# Patient Record
Sex: FEMALE | Race: BLACK OR AFRICAN AMERICAN | Employment: UNEMPLOYED | URBAN - METROPOLITAN AREA
[De-identification: names, ages, dates, MRNs, and addresses within clinical notes are randomized per-mention and may not be internally consistent; named-entity substitution may affect disease eponyms.]

---

## 2017-12-28 ENCOUNTER — TELEPHONE (OUTPATIENT)
Dept: BEHAVIORAL HEALTH | Facility: CLINIC | Age: 27
End: 2017-12-28

## 2017-12-28 NOTE — TELEPHONE ENCOUNTER
S:  12/28/17 Call from Candi Fernandes (/[phone])   referring client for diagnostic assessment for the MICD Program  Reported the client does not have any OP providers at this time.   B:  Reported client has a lot of grief and sadness. She has had   lot of loses; she has difficulty processing the lose in her family   and death of her partner. Also reported the client has a hx of  Marijuana, denies legal issues  A:  DA for MICD  R:   will call back with the insurance information,  to be referred to MICD. DEBRA

## 2018-01-26 NOTE — TELEPHONE ENCOUNTER
1-26-18 Child Protection Worker Candi Fernandes [phone] called to ref. Client to Adult Dual DA.  She will fax Rule 25 to intake.    MH: PTSD,   Psychiatrist: none  Therapist: a few months ago Soledad Tejeda.  Meds: none  PCP: Unknown.    Requested roselyn.

## 2018-01-29 NOTE — TELEPHONE ENCOUNTER
Received an outdate rule 25 originally dated 11/28/17 then stated DOS authorized 12/8/17.  Hand written on Rule 25 on page 13 that Tona Nettles/Chris Welsh approved tx at [facility].  Spoke to Tona Nettles, [phone].  She stated pt was approved for [facility] not .  Was later told patient had started using opiates and wanted [facility].  Rule 25 is outdated and referring party needs to be referred back to [facility] and updated info provided.  Pt now has [facility] per Tona Nettles.  Tried to leave a message for Child Protection worker, Candi Fernandes, [phone], but voice mailbox was full.  Filed.  No referral made until hear from Chris Welsh rule 25 and r/o opiate use.

## 2018-01-29 NOTE — TELEPHONE ENCOUNTER
1-29-18 Per Josh at [facility] no auth needed for  DA.  Referral made to Adult Dual DA. fb

## 2018-03-13 ENCOUNTER — TELEPHONE (OUTPATIENT)
Dept: BEHAVIORAL HEALTH | Facility: CLINIC | Age: 28
End: 2018-03-13

## 2018-03-13 NOTE — TELEPHONE ENCOUNTER
Telephone  Open      12/28/2017  Behavioral Health Intake    Generic, Behavioral Intake, MD     Outpatient   Reason for call    Conversation: MH Outpatient   (Newest Message First)   February 22, 2018   Joan Espinoza        11:38 AM   Note      Date: 2/22/18     Pt. Called to schedule DA for 3/12/18 @ 0900 w/Oskar P. For MICD.                                Casie Campos   to Joan Espinoza           11:37 AM   Pt. called to reschedule DA for 3/12/18 @ 0900 w/Oskar for MICD.    February 8, 2018   Me        9:04 AM   Note      MICD DA scheduled 2/20/18 @ 1200 w/ Oskar P.         Casie Campos   to Me           9:04 AM   Pt called and rescheduled DA for 2/20/18 @ 1200 w/ Oskar P.  Confirmed location.      February 7, 2018   Me   to Casie Campos           10:22 AM   2/6/18 @ 1505: Pt called requesting to reschedule NO CALL / NO SHOW missed DA.  Called pt and left vmail requesting call back to reschedule DA.    January 30, 2018   Joan Espinoza        12:34 PM   Note      Date: 01/30/18     Scheduled DA for MICD 2/5 @ 0900 w/Oskar.          Casie Campos   to Joan Espinoza           12:34 PM   Pt. called to schedule DA for 2/5 @ 0900 for MICD w/Oskar    Me   to Casie Campos           10:18 AM   Called to schedule DA for MICD. Left Vm.    January 29, 2018   Nohemy Atkinson        1:12 PM   Note      1-29-18 Per Josh at [facility] no auth needed for MH DA.  Referral made to Adult Dual DA. maxime Campos        10:35 AM   Note      Received an outdate rule 25 originally dated 11/28/17 then stated DOS authorized 12/8/17.  Hand written on Rule 25 on page 13 that Tona Nettles/Chris Welsh approved tx at [facility].  Spoke to Tona Nettles, [phone].  She stated pt was approved for [facility] not [facility].  Was later told patient had started using opiates and wanted [facility].  Rule 25 is outdated and referring party needs to be referred back to [facility] and updated info provided.  Pt now has [facility] per  Tona Nettles.  Tried to leave a message for Child Protection worker, Candi Fernandes, [phone], but voice mailbox was full.  Filed.  No referral made until hear from Chris Welsh rule 25 and r/o opiate use.          January 26, 2018   Nohemy Atkinson        3:44 PM   Note      1-26-18 Child Protection Worker Candi Fernandes [phone] called to ref. Client to Adult Dual DA.  She will fax Rule 25 to intake.     MH: PTSD,   Psychiatrist: none  Therapist: a few months ago Soledad Tejeda.  Meds: none  PCP: Unknown.     Requested roselyn.             December 28, 2017   Silver Russell        12:21 PM   Note      S:  12/28/17 Call from Candi Fernandes (/[phone])   referring client for diagnostic assessment for the MICD Program  Reported the client does not have any OP providers at this time.   B:  Reported client has a lot of grief and sadness. She has had   lot of loses; she has difficulty processing the lose in her family   and death of her partner. Also reported the client has a hx of  Marijuana, denies legal issues  A:  DA for MICD  R:   will call back with the insurance information,  to be referred to MICD. DEBRA

## 2018-03-27 ENCOUNTER — BEH TREATMENT PLAN (OUTPATIENT)
Dept: BEHAVIORAL HEALTH | Facility: CLINIC | Age: 28
End: 2018-03-27
Attending: NURSE PRACTITIONER

## 2018-03-27 ENCOUNTER — HOSPITAL ENCOUNTER (OUTPATIENT)
Dept: BEHAVIORAL HEALTH | Facility: CLINIC | Age: 28
Discharge: HOME | End: 2018-03-27
Attending: PSYCHIATRY & NEUROLOGY | Admitting: PSYCHIATRY & NEUROLOGY
Payer: COMMERCIAL

## 2018-03-27 DIAGNOSIS — F43.10 PTSD (POST-TRAUMATIC STRESS DISORDER): Primary | ICD-10-CM

## 2018-03-27 PROCEDURE — 90791 PSYCH DIAGNOSTIC EVALUATION: CPT | Performed by: PSYCHOLOGIST

## 2018-03-27 ASSESSMENT — PAIN SCALES - GENERAL: PAINLEVEL: EXTREME PAIN (8)

## 2018-03-27 NOTE — PROGRESS NOTES
Initial Individual Treatment Plan     Patient: Casie Campos   MRN: [id number]  : 1990  Age: 28 year old  Sex: female    Diagnostic Assessment Date / Date of Initial Individual Treatment Plan: 3/27/18      Immediate Health Concerns:  No     Immediate Safety Concerns:  No. Per history, see safety plan.    Identify the issues to be addressed in treatment:  Symptom Management, Personal Safety, Community Resources/Discharge Planning, Abstinence/Relapse Prevention, Develop / Improve Independent Living Skills and Develop Socialization / Interpersonal Relationship Skills    Client Initial Individualized Goals for Treatment: work on depression and anxiety.       Initial Treatment suggestions for the client during the time between Diagnostic Assessment and completion of the Individualized Treatment Plan:  Follow Safety Plan  Abstain from Substance Use   Ask for more information, support and/or assistance as needed.  Follow up with providers/community supports as needed:   Report increases or changes in symptoms to staff.  Report any personal safety concerns to staff.   Take medications as prescribed.  Report medication changes and/or side effects to staff.  Attend and participate in groups as scheduled or notify staff if unable to do so.  Report any use of substances to staff as this may impact your symptoms and/or  personal safety.  Notify staff if you have any other issues that need to be addressed. This may include  any current abuse / neglect / exploitation or other vulnerability.  Follow recommendations of your treatment team and discuss concerns if not in  agreement.     Treatment Team Responsible: MI/CD Treatment (MICD)      Therapeutic Interventions/Treatment Strategies may include:  Support, Redirection, Feedback, Limit/Boundaries, Safety Assessments, Structured Activity, Problem Solving, Clarification, Education, Motivational Enhancement and Relapse Prevention as needed.    Anthony Erickson  Juvenal        Admission Date: 4/4/2018    The Initial Individual Treatment Plan was reviewed with Casie A Andres upon admission.      Casie reported her last use of substances as: two days ago    Identify any current concerns with potential to impact admission:     withdrawal/intoxication: No withdrawal.      medication/medical concerns: No med changes, no medical concerns.      immediate safety concerns: No concerns     Other: No other concerns    DIMENSION  ADMIT RATING   1 Acute Intoxication / Withdrawal Potential 0   2 Biomedical Conditions & Complications 1   3 Emotional / Behavioral / Cognitive Conditions & Complications 2   4 Treatment Acceptance / Resistance: 1   5 Continued Use / Relapse Prevention 3   6 Recovery Environment 1         Completed by: Anthony Sanford MA [facility]

## 2018-03-27 NOTE — PROGRESS NOTES
MY COPING PLAN FOR SAFETY          Things that are most important to me & reasons for living: Kids               My relapse Warning Signs: depression, sadness, isolation  I will make my environment safer by: Nothing identified.   When in crisis, I will use the following coping skills: (relaxation/self-soothing/distraction/activity):  Coloring, headphones, singing  I will use My Support System:   Personal Supports: I can ask for reminders, support, or for them to stay with me  Trusted Friend/s:     Family Member/s :                   Professional Supports: I can ask for med changes, emergency appointments, help  Psychiatrist:    Therapist:      Other:     Crisis Lines I can call to discuss options and to access support:  [facility]:    [place] ([facility]) [phone]   [place] (Mental Health Crisis Program) [phone]   [place]  [phone]   [place] (COPE) [phone]   [place] [phone]   [place] ([facility]  Health Crisis Line) [phone]   [place] ([place], [place], [place], [place], [facility]) [phone]   Other Crisis Lines:   C[facility]is [facility] (Counseling) [phone]   National Suicide Prevention [phone]   Suicide Prevention [phone]        yes   I will attend my Treatment Program and talk to my one of my therapists:       yes   I agree that I will report any current / recent thoughts, impulses or plans of suicide,           homicide, self injurious behavior or substance use .   yes   I agree that I will not act on the above symptoms, but will follow the above plan         instead.  I can also go to the Emergency Department at [facility]: [facility] [phone] or call: [phone]

## 2018-03-27 NOTE — PROGRESS NOTES
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    Referrals to services required by statute or rule:   Report to child/adult protection services was NA.     Program Recommendation: MI/CD Treatment (MICD) .      Assessment Completed by: Anthony Sanford MA [facility]    "

## 2018-03-27 NOTE — PROGRESS NOTES
Acknowledgement of Current Treatment Plan       I have reviewed my treatment plan with my therapist / counselor on 3/27/2018. I agree with the plan as it is written in the electronic health record.    Name Signature   Casie Campos    Name of Therapist / Counselor    Anthony Sanford MA [facility]

## 2018-03-28 ENCOUNTER — TELEPHONE (OUTPATIENT)
Dept: BEHAVIORAL HEALTH | Facility: CLINIC | Age: 28
End: 2018-03-28

## 2018-03-28 NOTE — TELEPHONE ENCOUNTER
----- Message from Anthony Sanford LP sent at 3/28/2018 10:41 AM CDT -----  Regarding: Start MICD  Robel     Pt has a planned start in the 9 AM track of MICD IOP on 4/4/2018.    Thank you,    Anthony Sanford MA, LP [facility]

## 2018-04-04 ENCOUNTER — HOSPITAL ENCOUNTER (OUTPATIENT)
Dept: BEHAVIORAL HEALTH | Facility: CLINIC | Age: 28
End: 2018-04-04
Attending: NURSE PRACTITIONER
Payer: MEDICAID

## 2018-04-04 VITALS — WEIGHT: 133 LBS | BODY MASS INDEX: 22.71 KG/M2 | HEIGHT: 64 IN

## 2018-04-04 LAB
AMPHETAMINES UR QL SCN: POSITIVE
BARBITURATES UR QL: NEGATIVE
BENZODIAZ UR QL: NEGATIVE
CANNABINOIDS UR QL SCN: POSITIVE
COCAINE UR QL: NEGATIVE
ETHANOL UR QL SCN: NEGATIVE
OPIATES UR QL SCN: POSITIVE

## 2018-04-04 PROCEDURE — 80307 DRUG TEST PRSMV CHEM ANLYZR: CPT | Performed by: NURSE PRACTITIONER

## 2018-04-04 PROCEDURE — 80320 DRUG SCREEN QUANTALCOHOLS: CPT | Performed by: NURSE PRACTITIONER

## 2018-04-04 PROCEDURE — 80349 CANNABINOIDS NATURAL: CPT | Performed by: NURSE PRACTITIONER

## 2018-04-04 PROCEDURE — H2012 BEHAV HLTH DAY TREAT, PER HR: HCPCS

## 2018-04-04 ASSESSMENT — ANXIETY QUESTIONNAIRES
6. BECOMING EASILY ANNOYED OR IRRITABLE: MORE THAN HALF THE DAYS
1. FEELING NERVOUS, ANXIOUS, OR ON EDGE: MORE THAN HALF THE DAYS
IF YOU CHECKED OFF ANY PROBLEMS ON THIS QUESTIONNAIRE, HOW DIFFICULT HAVE THESE PROBLEMS MADE IT FOR YOU TO DO YOUR WORK, TAKE CARE OF THINGS AT HOME, OR GET ALONG WITH OTHER PEOPLE: SOMEWHAT DIFFICULT
7. FEELING AFRAID AS IF SOMETHING AWFUL MIGHT HAPPEN: MORE THAN HALF THE DAYS
GAD7 TOTAL SCORE: 14
3. WORRYING TOO MUCH ABOUT DIFFERENT THINGS: MORE THAN HALF THE DAYS
2. NOT BEING ABLE TO STOP OR CONTROL WORRYING: MORE THAN HALF THE DAYS
5. BEING SO RESTLESS THAT IT IS HARD TO SIT STILL: MORE THAN HALF THE DAYS

## 2018-04-04 ASSESSMENT — PATIENT HEALTH QUESTIONNAIRE - PHQ9: 5. POOR APPETITE OR OVEREATING: MORE THAN HALF THE DAYS

## 2018-04-04 NOTE — TELEPHONE ENCOUNTER
----- Message from Chloe Bowen sent at 4/4/2018  2:15 PM CDT -----  Pt is no longer active with ma as of 3/31/2018 no insurance for up coming appointment

## 2018-04-04 NOTE — PROGRESS NOTES
RN Review of Medical History / Physical Health Screen  Outpatient Behavioral Programs      CLIENT'S NAME: Casie Campos  MRN:   [id number]  :   1990 AGE:28 year old SEX: female    DATE OF DIAGNOSTIC ASSESSMENT: 3/27/18  DATE OF ADMISSION: 18   PROGRAM: MI/CD Treatment (MICD)      Following admission, the RN reviewed the following:    - Medical History / Physical Health Screen completed during the DA noted above.  - Immediate Health Concerns as noted on the Initial Individual Treatment Plan.    Client height and weight recorded by RN in epic: yes    BMI Review:  Was the patient informed of BMI? yes      Findings Normal, No Intervention       RN Recommendations include: Continue to educate on nutrition and exercise. Educate on the importance to monitor regularly and if increase continues follow up with primary provider.        Suman Barragan  2018

## 2018-04-04 NOTE — PROGRESS NOTES
"MICD Progress Note / Treatment Plan Review       Patient: Casie Campos   MRN: [id number]  : 1990  Age: 28 year old  Sex: female    Week of: 18-18     Client Initial Individualized Goals for Treatment:  work on depression and anxiety.     See Initial Treatment suggestions for the client during the time between Diagnostic Assessment and completion of the Master Individualized Treatment Plan.    Treatment Goals:    Treatment plan will be formulated on 18.    Active Psychiatric Symptoms Impairing:   Thought, Mood, Behavior and Perception    Area of Treatment Focus:  Symptom Management, Personal Safety, Community Resources/Discharge Planning and Abstinence/Relapse Prevention    Treatment Strategies:   Support, Feedback, Limit/Boundaries, Safety Assessments, Structured Activity, Problem Solving, Clarification, Education, Motivational Enhancement Therapy and Cognitive Behavioral Therapy    Patient Response:  Accepted Feedback, Gave Feedback, Listened, Focused on Goals, Attentive, Accepted Support and Alert    Dimension Risk Description and Outcome:    Dimension One: Acute Intoxication/Withdrawal Potential   Daily Note:  2018:  Casie reports that her last use occurred 2 days ago.  (CUONG)    Dimension Two: Biomedical Conditions and Complications  Daily Note:  2018:  Denies any physical health issues reported at this time.  (CUONG)    Dimension Three: Emotional/Behavioral / Cognitive Conditions & Complications  Daily Note:  2018: Casie introduced herself to the group and states that she would Iike to \"simply observe\" group today.  She did not participate in psychotherapy.  (CUONG)    Dimension Four: Treatment Acceptance / Resistance  Daily Note:  2018:  Interpersonal Relationships (10-10:50am):  Casie actively listened during the group on Trust and participated minimally however she expressed that she would be mostly \"observing\" groups today.  (CUONG) 2018:  Casie did not " attend treatment on this date as a result of oversleeping.  (CUONG)    Dimension Five: Continued Use/Relapse Prevention  Daily Note: 4/4/2018:  At high risk for continued use of chemicals.  (CUONG) 4/04/[id number] - Relapse Prevention Group:  Casie introduced self and shared about how she feels discontinuing use will be a challenge. She reports starting her use of marijauana at a young age and not really knowing how else life is supposed to be. While interacting in peer discussion, Casie related through use of stories from her own family - one who is addicted to opiates, the other who has severe pancreatitis.  Casie required some redirection to focus back on the peer or on her own experience. Casie listened during a peer behavior chain to process relapse and offered feedback and support. Group briefly discussed the long term damage to the body and brain from heavy substance use and the hope that although some damage may be irreversible, that there are many ways the body and brain can regenerate healthy cells and build new pathways. Casie would benefit from more relapse prevention education and support (JASWANT)    Dimension Six: Recovery Environment  Daily Note:  4/4/2018:  Has CPS involvement.  (CUONG)      Weekly Progress / Treatment Plan Review      Are there additional progress notes for this week? Yes (see additional progress notes)    Are Treatment Plan Goals being addressed? Treatment plan will be formulated on 4/11/18.    Are treatment plan strategies to address goals effective? N/A    Are there any current contracts in place? No    Client: N/A     Client: N/A    Was client case reviewed with DELISA Trevino, JARETT and/or Eleonora Cloud MD and the treatment team this week? yes    Staff: Xiomara Rosenbaum [facility](CUONG); Rosemarie Vanegas [facility], [facility] (DD)

## 2018-04-04 NOTE — TELEPHONE ENCOUNTER
Copy of note to ng14:    Pt apparently has no ins since 4-1-18.   Please direct pt to Financial Counselor for p/a as needed as pt is now self pay.       4/4/2018 per mn-its online pt is no longer active with ma as of 3/31/2018 pmi# [id number]    DDP notified.   gloriam

## 2018-04-05 ENCOUNTER — TELEPHONE (OUTPATIENT)
Dept: BEHAVIORAL HEALTH | Facility: CLINIC | Age: 28
End: 2018-04-05

## 2018-04-05 ASSESSMENT — PATIENT HEALTH QUESTIONNAIRE - PHQ9: SUM OF ALL RESPONSES TO PHQ QUESTIONS 1-9: 15

## 2018-04-05 ASSESSMENT — ANXIETY QUESTIONNAIRES: GAD7 TOTAL SCORE: 14

## 2018-04-05 NOTE — TELEPHONE ENCOUNTER
FELIXKristen Jason did not attend treatment on this date and has not called to report her absence.  I.  Writer called Casie and left a message requesting a return call to check in with staff.   A.  Unable to assess.  P.  Monitor attendance.  Xiomara Rosenbaum, [facility]

## 2018-04-06 LAB — CANNABINOIDS UR CFM-MCNC: 375 NG/ML

## 2018-04-10 NOTE — TELEPHONE ENCOUNTER
FELIXKristen Jason did not attend treatment on this date and has not called to report her absence.  I.  Writer called Paulina and left a message requesting a return call to check in with staff.  A.Unable to assess.  P.  Monitor attendance. Xiomara Rosenbaum, [facility]

## 2018-04-11 ENCOUNTER — TELEPHONE (OUTPATIENT)
Dept: BEHAVIORAL HEALTH | Facility: CLINIC | Age: 28
End: 2018-04-11

## 2018-04-12 ENCOUNTER — TELEPHONE (OUTPATIENT)
Dept: BEHAVIORAL HEALTH | Facility: CLINIC | Age: 28
End: 2018-04-12

## 2018-04-12 NOTE — TELEPHONE ENCOUNTER
Informed pt that insurance is now straight MA. Pt will return ti Tx tomorrow.    SHAVONNE Laura, [facility]  4/12/2018

## 2018-04-13 NOTE — TELEPHONE ENCOUNTER
D: CPS worker LM earlier today.     I: LM back to discuss their ability to reschedule supervised visits to accommodate Tx schedule.     A: n/a    P: Requested Kyleigh call back and also provided number for Xiomara Rosenbaum as an alternative if writer is not available.    Rosemarie Vanegas, SHAVONNE, [facility]  4/13/2018

## 2018-04-13 NOTE — PROGRESS NOTES
[facility]       Patient: Casie Campos   MRN: [id number]  : 1990  Age: 28 year old  Sex: female    Week of: 18-18     Client Initial Individualized Goals for Treatment: work on depression and anxiety.     See Initial Treatment suggestions for the client during the time between Diagnostic Assessment and completion of the Master Individualized Treatment Plan.    Treatment Goals:     Treatment plan will be formulated upon return to the program.      Active Psychiatric Symptoms Impairing:   Thought, Mood, Behavior and Perception    Area of Treatment Focus:  Symptom Management, Personal Safety, Community Resources/Discharge Planning and Abstinence/Relapse Prevention    Treatment Strategies:   Unable to assess due to absences.    Patient Response:  N/A    Dimension Risk Description and Outcome:    Dimension One: Acute Intoxication/Withdrawal Potential   Daily Note: 2018:  Unable to assess due to absence. (CUONG)    Dimension Two: Biomedical Conditions and Complications  Daily Note: 2018: Unable to assess.  (CUONG)    Dimension Three: Emotional/Behavioral / Cognitive Conditions & Complications  Daily Note: 2018: Unable to assess.  (CUONG)    Dimension Four: Treatment Acceptance / Resistance  Daily Note: 2018: Unable to assess.  (CUONG)    Dimension Five: Continued Use/Relapse Prevention  Daily Note: 2018: Unable to assess.  (CUONG)    Dimension Six: Recovery Environment  Daily Note: 2018: Unable to assess.  (CUONG)      Weekly Progress / Treatment Plan Review      Are there additional progress notes for this week? Yes (see additional progress notes)    Are Treatment Plan Goals being addressed? Treatment plan will be formulated upon Casie's return to the program      Are treatment plan strategies to address goals effective? N/A    Are there any current contracts in place? No    Client: N/A     Client: N/A    Was client case reviewed with Sudhir  DELISA Currie, JARETT and/or Eleonora Cloud MD and the treatment team this week? yes    Staff: SHAVONNE Adam([facility])

## 2018-04-16 ENCOUNTER — TELEPHONE (OUTPATIENT)
Dept: BEHAVIORAL HEALTH | Facility: CLINIC | Age: 28
End: 2018-04-16

## 2018-04-17 ENCOUNTER — TELEPHONE (OUTPATIENT)
Dept: BEHAVIORAL HEALTH | Facility: CLINIC | Age: 28
End: 2018-04-17

## 2018-04-17 NOTE — TELEPHONE ENCOUNTER
MAYA Jason did not attend treatment on this date and has not called to report her absence.  I.  Writer called Casie and explained that she needs to return by tomorrow or she will be discharged from the program.  Casie states that she will be in attendance.  A.  Unable to assess.  P.  Monitor attendance.  Discharge if absences continue.  Xiomara Rosenbaum, [facility]

## 2018-04-18 ENCOUNTER — HOSPITAL ENCOUNTER (OUTPATIENT)
Dept: BEHAVIORAL HEALTH | Facility: CLINIC | Age: 28
End: 2018-04-18
Attending: NURSE PRACTITIONER
Payer: MEDICAID

## 2018-04-18 PROCEDURE — H2012 BEHAV HLTH DAY TREAT, PER HR: HCPCS

## 2018-04-18 NOTE — PROGRESS NOTES
Adult Mental Health   Mental Health Program   Progress Note     Client Initial Individualized Goals for Treatment: work on depression and anxiety.       See Initial Treatment suggestions for the client during the time between Diagnostic Assessment and completion of the Master Individualized Treatment Plan.           Name of Group:  Exercise    Group Time: 10:00-10:50    Description and Outcome:  Group discussed the importance of Exercise. The 3 different types of exercise were discussed, cardio-vascular, strength and flexibility.Group talked about the benefits to exercise and discussed was to incorporate exercise into their weekly routines. Group discussed mental barriers to exercise, safety tips while exercising, motivation techniques to exercise and group worked through case study to problem solve ways to incorporate exercise into daily life. Mindfulness chair yoga was practiced for 15 minutes.        04/18/18 1000   Other Patient Education   Intervention Verbal instruction;Instruction handout;Video/Audio  (Excercise)   Identified Learner Patient   Readiness to Learn Asks questions;Cooperative   Response to Teaching verbalizes understanding;demonstrates behavior change;progress on Tx plan goals;continue Tx plan goals   Treatment Focus symptom management;personal safety   Comments stated she wants to start small goals      Patient stated she enjoyed walking.

## 2018-04-18 NOTE — PROGRESS NOTES
Individualized Treatment Plan     Date of Plan: 2018    Name: Casie Campos MRN: [id number]    : 1990    Programs[facility]  [facility]     Clinical Track (if applicable):  9am    DSM5 Diagnosis  296.22 (F32.1)  Major Depressive Disorder, Single Episode, Moderate _  309.81 (F43.10) Posttraumatic Stress Disorder (includes Posttraumatic Stress Disorder for Children 6 Years and Younger)  Without dissociative symptoms  Substance-Related & Addictive Disorders 304.30 (F12.20) Cannabis Use Disorder Severe  .    Team Members Contributing to Plan:  Rosemarie Vanegas, Nicolasa Dennis, Sander Reddy, Oskar Sanford, Xiomara Rosenbaum and Suman Barragan    Client Strengths:  caring, empathetic, open to learning and dedication to children and focus    Client Participation in Plan:  Contributed to goals and plan   Attended individual treatment plan meeting on 18  Agrees with plan   Received copy of treatment plan   Discussed with staff   Family members attended. No. Were they invited? No. Paulina states that she doesn't have family in the area.    Areas of Vulnerability:  Suicidal Ideation   Poor impulse control   Anxiety  Depressive symptoms   Learning barrier: poor memory   Trauma/Abuse/Neglect    Long-Term Goals:  Knowledge about illness and management of symptoms   Maintenance of personal safety   Maintenance of sobriety   Effective management of impulsivity   Get children back  Finish treatment  Find a job     Abuse Prevention Plan:  Safe, therapeutic environment   Safety coping plan as needed   Education regarding illness and skill development   Coordination with care providers   Impluse control education and intervention   Medication adjustment/management (MI/CD)   Monitor for use of substances    Discharge Criteria:  Satisfactory progress toward treatment goals   Improvement re: identified problems and symptoms   Ability to continue recovery at next level of service   Has a discharge plan in place   Has safety/coping  "plan in place   Ability to maintain sobriety  Share autobiography   Complete goodbye letter assignment   Complete coping cards   Regular attendance as scheduled     Areas of Treatment Focus            Area of Treatment Focus:   Personal Safety  Start Date:    4/18/2018    Dimension:   III. Emotional / Behavioral Condition    Description:    Casie states that she has moments when she \"does not wish to be here\" however she denies any suicidal ideation or thoughts to self harm at this time.      Goal:  Target Date: 5/16/18, 6/13/18 Status: Completed  Client will notify staff when needing assistance to develop or implement a coping plan to manage suicidal or self injurious urges.  Client will use coping plan for safety, as needed.      Progress:   5/16/18:  Casie denies any recent thoughts to self harm.  Goal CONTINUED until she completes the program.      6/1/18:  Goal COMPLETED.  Casie denies any recent thoughts to self harm and has successfully completed the program.      Treatment Strategies:   Assist clients in establishing / strengthening support network  Assist to identify treatment goals  Assess / reassess for appropriate therapy program involvement, encourage participation in therapies  Assess / reassess level of potential for harm to self or others  Engage in safety planning when indicated  Facilitate increased self awareness  Provide feedback about social skills  Teach adaptive coping skills and communication skills        Area of Treatment Focus:   Symptom Stabilization and Management  Start Date:    4/18/18    Dimension:   II. Biomedical Conditions and III. Emotional / Behavioral Condition    Description:    Juliets symptoms include anxiety, depression, poor memory, low appetite, flashbacks, and nightmares.  She also reports difficulty with managing frustration and effectively using her time, having desire to try to engage in more productive and positive activities.    Goal:  Target Date: " 5/16/18, 6/13/18 Status: Completed  Casie will attend treatment for 5 days a week/ 3 hours per day. (COMPLETED)    Paulina will take time 1-2/week in psychotherapy to identify current symptoms or stressors and to develop a coping strategy for each using skills she has braxton taught in group.      While in skills groups, Casie will report on a benefit of engagement in her chosen activity for the session, focusing on ways to be more positive and productive and manage her mental health more effectively, once each week.      Progress:   5/16/18  #1:  Paulina has greatly improved her attendance and has COMPLETED this goal.      #2:  Paulina is making progress with her ability to communicate her symptoms and stressors in addition to identifying the coping strategies that she finds most helpful.  Paulina states that she is working on mindfulness and calling supportive people when she needs help.  Goal CONTINUED per Pauilna's request for further development with this goal.      #3: Paulina is making progress with this goal, being able to initiate more activities for herself and working to engage in more positive and overall productive activities. She is able to note how some of these activities are coping skills for herself and managing her mental health. CONTINUE goal for further development.    6/1/18  #2:  Paulina reports that she is using a wide variety of skills that she has learned from the mindfulness and Emotions Management groups while in the program.  She will continue to work on these skills with her individual therapist.  Goal COMPLETED.      #3: Goal COMPLETED, although Paulina was less engaged in learning and engagement in activities for health management as she began to complete treatment. She also had decreased attendance again.    Treatment Strategies:   Assist clients in establishing / strengthening support network  Assist to identify treatment goals  Assess / reassess for appropriate therapy program  involvement, encourage participation in therapies  Assess / reassess level of potential for harm to self or others  Engage in safety planning when indicated  Facilitate increased self awareness  Teach adaptive coping skills and communication skills   Provide a variety of structured activities to build daily living skills and manage symptoms  Educate and provide opportunity to practice self regulation through the use of sensory modulation techniques        Area of Treatment Focus:   Abstinence / Relapse Prevention  Start Date:    4/18/2018    Dimension:   I. Acute Intoxication / Withdrawal Potential, IV. Treatment Acceptance / Resistance and V. Relapse    Description:   Juliets use of chemicals resulted in child custody issues and has exacerbated her mental health symptoms.      Goal:  Target Date: 5/16/18, 6/13/18 Status: Completed/stopped  Casie will have 0 instances of chemical use, reporting outcomes to the group weekly.       Paulina will schedule her day with sober activity, reporting follow through or difficulty to the group daily.        Progress:   5/16/18  #1: Casie has maintained her sobriety for over one month.  Goal  CONTINUED.      #2: Paulina is making progress with this as she has begun to focus on reporting on engaging in doing this more consistently. Will CONTINUE goal for further development and look to expand types of sober activities she can engage in.    6/1/18:  #1:  Paulina has maintained her sobriety for the past 6 weeks.  Goal COMPLETED.    #2:  Paulina has had some struggle with finding sober support and is reluctant to attend AA or NA.  Goal STOPPED.      Treatment Strategies:   Assist clients in establishing / strengthening support network  Assist to identify treatment goals  Engage in safety planning when indicated  Facilitate increased self awareness  Provide education regarding relapse prevention  Teach adaptive coping skills and communication skills        Area of Treatment  Focus:   Community Resources / Support and Discharge Planning  Start Date:    4/18/2018      Dimension:   VI. Recovery Environment    Description:    Casie would like to expand her sober support network as she has few sober friends and her family lives out of town.      Goal:  Target Date: 5/16/18, 6/13/18 Status: Completed  Will enlist involvement of support network by attending 1 NA/AA meeting per week reporting follow through to the group during weekly goal setting.       Paulina will make an appointment with an individual therapist by the target date.      Progress:   5/16/18:  #1:  Paulina attended several AA meetings since the start of treatment.  She feels that she has COMPLETED this goal.      #2: Goal CONTINUED.  Paulina still needs to make an appointment with an individual therapist.    6/1/18:    Goal #2:  Paulina has made an appointment with a therapist at the [facility].  Goal COMPLETED.        Treatment Strategies:   Assist clients in establishing / strengthening support network  Assist to identify treatment goals  Assist with discharge planning  Facilitate increased self awareness  Teach adaptive coping skills and communication skills

## 2018-04-18 NOTE — PROGRESS NOTES
Acknowledgement of Current Treatment Plan       I have reviewed my treatment plan with my therapist / counselor on 4/18/2018. I agree with the plan as it is written in the electronic health record.    Name Signature   Casie Campos    Name of Therapist / Counselor    Xiomara Rosenbaum, [facility]

## 2018-04-18 NOTE — PROGRESS NOTES
MICD Progress Note / Treatment Plan Review       Patient: Casie Campos   MRN: [id number]  : 1990  Age: 28 year old  Sex: female    Week of: 18-18     Client Initial Individualized Goals for Treatment:  work on depression and anxiety.     See Initial Treatment suggestions for the client during the time between Diagnostic Assessment and completion of the Master Individualized Treatment Plan.    Treatment Goals:  Client will notify staff when needing assistance to develop or implement a coping plan to manage suicidal or self injurious urges.  Client will use coping plan for safety, as needed.    Casie will attend treatment for 5 days a week/ 3 hours per day.     Paulina will take time 1-2/week in psychotherapy to identify current symptoms or stressors and to develop a coping strategy for each using skills she has braxton taught in group    Casie will have 0 instances of chemical use, reporting outcomes to the group weekly.       Paulina will schedule her day with sober activity, reporting follow through or difficulty to the group daily.      Will enlist involvement of support network by attending 1 NA/AA meeting per week reporting follow through to the group during weekly goal setting.       Paulina will make an appointment with an individual therapist by the target date.     Active Psychiatric Symptoms Impairing:   Thought, Mood, Behavior and Perception    Area of Treatment Focus:  Symptom Management, Personal Safety, Community Resources/Discharge Planning and Abstinence/Relapse Prevention    Treatment Strategies:   Support, Feedback, Limit/Boundaries, Safety Assessments, Structured Activity, Problem Solving, Clarification, Education, Motivational Enhancement Therapy and Cognitive Behavioral Therapy    Patient Response:  Accepted Feedback, Gave Feedback, Listened, Attentive and Alert    Dimension Risk Description and Outcome:    Dimension One: Acute Intoxication/Withdrawal Potential   Daily  "Note:  4/18/2018:  Paulina reports that she has been sober for approximately 1.5 weeks.  She denies any withdrawal symptoms at this time.  (CUONG)  4/19/2018:  Paulina reports continued sobriety.  (CUONG) 4/20/2018:  Sobriety maintained.  (CUONG)    Dimension Two: Biomedical Conditions and Complications  Daily Note:  4/18/2018:  Paulina denies any physical health concerns at this time.  (CUONG)  4/19/2018:  No changes.  (CUONG)    Dimension Three: Emotional/Behavioral / Cognitive Conditions & Complications  Daily Note:  4/18/2018:  (9-9:50am):  Casie Love) reintroduced herself to the group after missing 2 weeks of programming due to insurance concerns and other issues.  Paulina states that she is glad to be back in treatment as she needs to complete this program in order to get her children back.  She states that she is frustrated and tired, and is missing both her children and her partner who was murdered last year.  She currently denies thoughts to self harm. (CUONG)  4/19/2018: (9-9:50am):  Paulina reports that she is feeling \"tired and overwhelmed\" today as her Uncle is dying of cancer and this has been stressful for her.  Paulina states that she has been reaching out to the other group members for support and finds this helpful.  She currently denies thoughts to self harm.  (CUONG)  4/20/2018:  Paulina reports that she is feeling \"happy\" to be sober, but \"sad\" as it is 4/20 and she has never celebrated this as a sober person.  Paulina states that she is telling herself that this is \"any other day\" and plans to go to an NA meeting with her peers.  Paulina denies thoughts to self harm.  (CUONG)    Dimension Four: Treatment Acceptance / Resistance  Daily Note: 4/18/18:  Fully participated in psychotherapy.  (CUONG)  4/19/2018:  Interpersonal Relationships (10-10:50am):  Paulina participated in the group on \"Handling Conflict\".  She did not say much about her ability to handle conflict and admits that she needs more information on " "developing assertiveness skills.  (CUONG) 4/20/2018:  Paulina offered supportive feedback to her peers.  (CUONG) 4/20/18 MICD Education [id number]  Paulina was attentive during mindfulness discussion and engaged in experiential. (SS)    Dimension Five: Continued Use/Relapse Prevention  Daily Note:  4/18/2018:  Paulina reports that she is at a high risk for relapse.  (CUONG) 4/18/2018: 1100 AM - 12:00 PM, Relapse Prevention Group.  Pt reported she last used 4/13/18 (cannabis). She reports she had court yesterday regarding her CPS case and that increased her anxiety. She reports she has consistent cravings due to missing her kids. She reports she tries to avoid using people as a strategy to prevent relapse. We also discussed the topic of \"the Process of Relapse.\" Pt was active in group discussion. Client verbalized understanding of session content (PRACHI). 4/19/2018: Self Support Skills (11-11:50am):  Paulina completed her Weekend Planning Worksheet.  She states that she is at high risk for relapse since it is \"4/20\" this weekend.  Paulina plans to spend time with the group members going to AA and avoiding her using using peers.  (CUONG) 4/20/2018:  Moderate to high risk for relapse. (CUONG)    Dimension Six: Recovery Environment  Daily Note:  4/18/2018:  Minimal sober support.  Encouraged to find an AA or NA meeting before the end of the week.  (CUONG)     4/19/2018:  Plans to attend AA tomorrow night.  (CUONG)    Weekly Progress / Treatment Plan Review      Are there additional progress notes for this week? Yes (see additional progress notes)    Are Treatment Plan Goals being addressed? Yes, continue treatment goals    Are treatment plan strategies to address goals effective? Yes, continue treatment strategies    Are there any current contracts in place? Yes. Attendance    Client: Agrees with treatment plan changes     Client: Received copy of revised treatment plan    Was client case reviewed with DELISA Trevino, JARETT and/or " Eleonora Cloud MD and the treatment team this week? yes    [facility] ; Anthony Sanford MA [facility] (PRACHI), SHAVONNE Michelle (SS)

## 2018-04-19 ENCOUNTER — HOSPITAL ENCOUNTER (OUTPATIENT)
Dept: BEHAVIORAL HEALTH | Facility: CLINIC | Age: 28
End: 2018-04-19
Attending: NURSE PRACTITIONER
Payer: MEDICAID

## 2018-04-19 PROCEDURE — H2012 BEHAV HLTH DAY TREAT, PER HR: HCPCS

## 2018-04-20 ENCOUNTER — HOSPITAL ENCOUNTER (OUTPATIENT)
Dept: BEHAVIORAL HEALTH | Facility: CLINIC | Age: 28
End: 2018-04-20
Attending: NURSE PRACTITIONER
Payer: MEDICAID

## 2018-04-20 PROCEDURE — H2012 BEHAV HLTH DAY TREAT, PER HR: HCPCS

## 2018-04-20 PROCEDURE — 97150 GROUP THERAPEUTIC PROCEDURES: CPT | Mod: GO

## 2018-04-20 NOTE — PROGRESS NOTES
"MICD Progress Note / Treatment Plan Review       Patient: Casie Campos   MRN: [id number]  : 1990  Age: 28 year old  Sex: female    Week of: 18-18    Client Initial Individualized Goals for Treatment: \"Work on depression and anxiety\".      See Initial Treatment suggestions for the client during the time between Diagnostic Assessment and completion of the Master Individualized Treatment Plan.    Treatment Goals:  Client will notify staff when needing assistance to develop or implement a coping plan to manage suicidal or self injurious urges.  Client will use coping plan for safety, as needed.    Casie will attend treatment for 5 days a week/ 3 hours per day.     Paulina will take time 1-2/week in psychotherapy to identify current symptoms or stressors and to develop a coping strategy for each using skills she has braxton taught in group    Casie will have 0 instances of chemical use, reporting outcomes to the group weekly.       Paulina will schedule her day with sober activity, reporting follow through or difficulty to the group daily.      Will enlist involvement of support network by attending 1 NA/AA meeting per week reporting follow through to the group during weekly goal setting.       Paulina will make an appointment with an individual therapist by the target date.     Active Psychiatric Symptoms Impairing:   Thought and Mood, Behavior, Perception    Area of Treatment Focus:  Symptom Management, Personal Safety, Community Resources/Discharge Planning and Abstinence/Relapse Prevention    Treatment Strategies:   Support, Feedback, Limit/Boundaries, Safety Assessments, Structured Activity, Problem Solving, Clarification, Education, Motivational Enhancement Therapy and Cognitive Behavioral Therapy    Patient Response:  Accepted Feedback, Gave Feedback, Listened, Focused on Goals, Attentive, Accepted Support and Alert    Dimension Risk Description and Outcome:    Dimension One: Acute " "Intoxication/Withdrawal Potential   Daily Note:  4/23/2018:  Casie reports that she was able to maintain her sobriety over the weekend.  She denies any withdrawal symptoms at this time.  (CUONG)  4/24/2018:  Casie reports continued sobriety.  No withdrawal symptoms reported.  (CUONG)  4/25/2018:  Sobriety maintained per self report.  (CUONG)  4/26/2018:  Casie reports that she has been sober since 4/15/18.  (CUONG)  4/27/2018:  Paulina reports continued sobriety.  (CUONG)    Dimension Two: Biomedical Conditions and Complications  Daily Note:  4/23/2018:  Casie reports that she feels fatigued, but denies any other physical health concerns.  (CUONG)  4/24/2018:  No changes.  (CUONG)  4/25/2018:  Reports feeling fatigued.  (CUONG)  4/27/2018:  Paulina states that she is feeling \"fatigued\" as a result of taking care of her elderly aunt and uncle.  (CUONG)    Dimension Three: Emotional/Behavioral / Cognitive Conditions & Complications  Daily Note:  4/23/2018:(11-11:50am) Paulina reports that she is feeling \"ok, but tired\" which she attributes to having her period.  She states that her main focus has been staying away from her using peers and seeking out the support of her peers in the group.  She currently denies thoughts to self harm.  (CUONG)  4/24/2018: (10-10:50am):  Paulina reports that she is feeling \"tired and frustrated\" as she had a very \"long day\" yesterday.   Paulina shares that she was asked to take care of her Uncle and Aunt who just were released from the hospital so she spent her afternoon and evening at their home.  Paulina states that althought this is difficult for her, it distracts her from missing her children.  Paulina reports that she is proud of herself for staying the course and coming to treatment daily as she is subsequently staying sober and feeling slightly more positive.  Paulina currently denies thoughts to self harm.  (CUONG)  4/25/2018:(9-9:50am)  Paulina reports that she is feeling anxious because she " "has not heard from her friend who is currently in the hospital.  She states that she is focused on \"staying clean\" and denies thoughts to self harm.  Paulina states that she does not need time in psychotherapy group.  (CUONG)  4/26/2018: (9-9:50am):  Paulina reports that she is feeling tired as she had a \"long day\" with her aunt an uncle yesterday.  Paulina states that she does not wish to take time in group and denies thoughts to self harm.  (CUONG)  4/27/2018: (9-9:50am):  Casie states that she is feeling \"tired and frustrated after not getting to see her children yesterday as scheduled by CPS.  Paulina states that CPS canceled the meetings at the last minute due to \"transportation issues\" but did not reschedule her appointment to see them.  Paulina states that this has happened on several occasions and she has formally lodged a complaint to her CPS worker's supervisor.  She curretnly denies thoughts to self harm.  (CUONG)    Dimension Four: Treatment Acceptance / Resistance  Daily Note:  4/23/2018:  Offered supportive feedback to peers during psychotherapy.  (CUONG) 4/24/2018:  Autobiography (11-11:50am):  Paulina viewed the video on \"Adults with Co-Occurring Disorders\".  She states that she could relate to the people in the video and that she is hoping that she will be able to get on medications soon in order to address her symptoms.  Paulina was a little distracted and needed some redirection from side conversations.  (CUONG)  4/25/2018:  Interpersonal Relationships (10-10:50am):  Paulina was very distracted in the group on \"Control in Relationships\".  She needed to be redirected to keep from texting and having side conversations with others.  Paulina noted that using drugs and alcohol can add to the control tactics between two people in a relationship.  She would benefit from more information on this topic.  (CUONG)  4/26/2018:  Paulina needed redirection to keep from using her phone in group.  (CUONG)  4/26/2018 Emotions " "Management (10:00-10:50 AM): Paulina participated in a skills group on understanding emotions. Paulina learned about the importance of emotions, their purpose, unlearned and learned emotional cues, identifying our perceptions, barriers to regulating emotions, and myths about our emotions. Paulina expressed frustration when asked not to eat in group, and spent the majority of group with her coat over her head. When asked if she was sleeping, Paulina reported she was listening. Paulina did not demonstrate understanding of the importance of understanding our emotions and the purpose they serve with her own examples/observations. Paulina could benefit from continued practice and guidance regulating and responding to emotions versus reacting. ()  4/27/2018:  Paulina needed prompts to participate in psychotherapy.  (CUONG)    Dimension Five: Continued Use/Relapse Prevention  Daily Note:  4/23/2018:  High risk for relapse.  Paulina reports strong urges to use THC.(CUONG) 4/24/2018:  Avoiding using peers.  (CUONG)   4/25/2018 [id number], Relapse Prevention Group: Pt reports she last used 4/13/2018 and now has 12 days abstinence. Pt reports consistent cravings. She prevents use by not being around people who use. Pt also participated in the group discussion regarding \"Stages of Change.\" Client verbalized understanding of session content (PRACHI). 4/27/2018:  Elevated risk for relapse due to increased disappointment and frustration.  (CUONG)    Dimension Six: Recovery Environment  Daily Note:  4/23/2018:  Attended 1 AA meeting over the weekend.  Has been calling sober peers for support.  (CUONG)  4/25/2018:  Encouraged to attend another AA or NA meeting.  (CUONG)      Weekly Progress / Treatment Plan Review      Are there additional progress notes for this week? No    Are Treatment Plan Goals being addressed? Yes, continue treatment goals    Are treatment plan strategies to address goals effective? Yes, continue treatment strategies    Are there " any current contracts in place? Yes. Attendance    Client: No changes at this time.       Client: N/A    Was client case reviewed with DELISA Trevino, JARETT and/or Eleonora Cloud MD and the treatment team this week? yes    Staff: Xiomara Rosenbaum [facility](CUONG) ; Anthony Sanford MA [facility] (PRACHI); Naomi Nails [facility]- Intern (MF)     Co-Sign: This (diagnostic assessment, individual treatment plan, treatment plan review or progress note) has been reviewed and approved by Nicolasa xiong MA, [facility], [facility]  in accordance with the requirements for Clinical Supervision of Outpatient Mental Health Services.  Nicolasa Dennis MA, [facility], [facility]  4/30/2018

## 2018-04-20 NOTE — PROGRESS NOTES
"Adult Mental Health Outpatient Group Therapy Progress Note     Client Initial Individualized Goals for Treatment: work on depression and anxiety.       See Initial Treatment suggestions for the client during the time between Diagnostic Assessment and completion of the Master Individualized Treatment Plan.    Treatment Goals:     see above: client has missed groups for first 1 1/2 weeks of treatment     Area of Treatment Focus:  Symptom Management, Community Resources/Discharge Planning, Abstinence/Relapse Prevention, Develop / Improve Independent Living Skills, Develop Socialization / Interpersonal Relationship Skills and Cultural / Spirituality    Therapeutic Interventions/Treatment Strategies:  Support, Feedback, Structured Activity, Problem Solving, Clarification, Education, Motivational Enhancement Therapy and orientation    Response to Treatment Strategies:  Accepted Feedback, Listened, Focused on Goals, Accepted Support and Alert    Name of Group:  OT Clinic 11:00 - 11:50     Description and Outcome:  Casie  participated in group that focused on the use of a variety of structured activities to build daily living skills, assist with follow through with activities of daily living, and practice effective coping skills. Client verbalized understanding of session content by noting understanding this purpose as oriented to OT. She did note not previous experience with OT. She was focused on the variety of activities in the clinic and did note her enjoyment of coloring. She was able to note some benefits that she does get from engagement in this activity related to coping skills. She began work on completing the questionnaire re: daily living skills, but then was drawn to look at activities. She chose a familiar activity and completed gathering supplies. She then was looking at other structured creative projects and noted her preference to working in this type. She was focused on what she could \"make as gifts\" " for her children. She then noted not completing the back side of the questionnaire. She asked about some other type projects, also.  Client would benefit from additional opportunities to practice and implement content from this session.  Will continue to assess. Appears to be impulsive at this time, but motivated to engage in activities.    Is this a Weekly Review of the Progress on the Treatment Plan?  Yes.      Are Treatment Plan Goals being addressed?  Yes, continue treatment goals      Are Treatment Plan Strategies to Address Goals Effective?  Yes, continue treatment strategies      Are there any current contracts in place?  No

## 2018-04-23 ENCOUNTER — HOSPITAL ENCOUNTER (OUTPATIENT)
Dept: BEHAVIORAL HEALTH | Facility: CLINIC | Age: 28
End: 2018-04-23
Attending: NURSE PRACTITIONER
Payer: MEDICAID

## 2018-04-23 PROCEDURE — H2012 BEHAV HLTH DAY TREAT, PER HR: HCPCS

## 2018-04-23 NOTE — PROGRESS NOTES
LOCUS Worksheet     Name: Casie Campos MRN: [id number]    : 1990      Gender:  female    PMI:  [id number]   Provider Name: SHAVONNE Adam    Provider NPI:  [id number]    Actual level of Care Provided:  Intensive Outpatient DDP    Service(s) receiving or referred to:  Intensive Outpatient DDP    Reason for Variance: None      Rating completed by: SHAVONNE Adam        I. Risk of Harm:   2      Low Risk of Harm    II. Functional Status:   3      Moderate Impairment    III. Co-Morbidity:   3      Significant Co-Morbidity    IV - A. Recovery Environment - Level of Stress:   4      Highly Stress Environment    IV - B. Recovery Environment - Level of Support:   4      Minimal Support in Environment    V. Treatment and Recovery History:   3      Moderate to Equivocal Response to Treatment and Recovery Management    VI. Engagement and Recovery Project:   2      Positive Engagement and Recovery       17 Composite Score    Level of Care Recommendation:   17 to 19       High Intensity Community Based Services

## 2018-04-23 NOTE — PROGRESS NOTES
Functional Assessment       Recipient Medicaid ID (PMI) #: [id number]    Recipient Social Security #: [id number]     Name: Casie Campos MRN: [id number]    : 1990      Time of functional assessment: Initial    Diagnostic assessment date: 3/27/18 Functional assessment date: 2018        1. Mental health symptoms  Moderate Problem    2. Mental health service needs  Moderate Problem    3. Use of drugs or alcohol  Severe Problem    4. Vocational functioning  Severe Problem    5. Educational functioning  Moderate Problem    6. Social functioning, including use of leisure time  Moderate Problem    7. Interpersonal functioning, including relationships with the adult's family  Moderate Problem    8. Self-care and independent living capacity  Slight Problem    9. Medical health  Slight Problem    10. Dental health  Moderate Problem    11. Obtaining and maintaining financial assistance  Moderate Problem    12. Obtaining and maintaining housing  Slight Problem    13. Using transportation  Severe Problem    14. Other area - N/A  No Problem    The following areas will be part of the Treatment Plan:  Mental health symptoms  Mental health service needs  Use of drugs or alcohol  Social functioning, including use of leisure time  Interpersonal functioning, including relationships with the adult's family  Self-care and independent living capacity           Staff Signature: Xiomara Rosenbaum [facility]        Date: 2018          MAYTE SOTO  Professional Signature: Xiomara Rosenbaum [facility]        Date: 2018

## 2018-04-23 NOTE — PROGRESS NOTES
Adult Mental Health Outpatient Group Therapy Progress Note     Client Initial Individualized Goals for Treatment: work on depression and anxiety.       See Initial Treatment suggestions for the client during the time between Diagnostic Assessment and completion of the Master Individualized Treatment Plan.    Treatment Goals:     Will enlist involvement of support network by attending 1 NA/AA meeting per week reporting follow through to the group during weekly goal setting.     Paulina will schedule her day with sober activity, reporting follow through or difficulty to the group daily.   Paulina will take time 1-2/week in psychotherapy to identify current symptoms or stressors and to develop a coping strategy for each using skills she has braxton taught in group.   Casie will attend treatment for 5 days a week/ 3 hours per day.        Area of Treatment Focus:  Symptom Management, Community Resources/Discharge Planning and Abstinence/Relapse Prevention    Therapeutic Interventions/Treatment Strategies:  Support, Feedback, Structured Activity, Problem Solving, Clarification, Education and Motivational Enhancement Therapy    Response to Treatment Strategies:  Accepted Feedback, Listened, Focused on Goals, Accepted Support, Alert and Distracted    Name of Group:  Goal Setting 9:00 - 9:50     Description and Outcome:  Casie participated in group that focused on learning about the benefits and practicing of setting realistic goals for treatment and management of mental and chemical health, along with self reflection on accomplishments and practicing problem solving obstacles. This was her first goal setting group and she was able to note benefits of doing this. When sharing her goals, she repeated herself several times, focusing on how staying away from people who used would help her stay sober and she just wanted to complete the program in order to be able to have her children with her. She did note she wanted to work  to stay busy, but had difficulty with some specifics. During the group, she tended to have some side conversations with a peer and left group once. Client verbalized understanding of session content by noting benefits of setting goals. Client would benefit from additional opportunities to practice and implement content from this session as she had some difficulty with being specific re: goal for managing her mental health symptoms and sobriety.      Is this a Weekly Review of the Progress on the Treatment Plan?  No
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
## 2018-04-24 ENCOUNTER — HOSPITAL ENCOUNTER (OUTPATIENT)
Dept: BEHAVIORAL HEALTH | Facility: CLINIC | Age: 28
End: 2018-04-24
Attending: NURSE PRACTITIONER
Payer: MEDICAID

## 2018-04-24 PROCEDURE — 97150 GROUP THERAPEUTIC PROCEDURES: CPT | Mod: GO

## 2018-04-24 PROCEDURE — H2012 BEHAV HLTH DAY TREAT, PER HR: HCPCS

## 2018-04-24 NOTE — PROGRESS NOTES
Adult Mental Health Outpatient Group Therapy Progress Note     Client Initial Individualized Goals for Treatment: work on depression and anxiety.       See Initial Treatment suggestions for the client during the time between Diagnostic Assessment and completion of the Master Individualized Treatment Plan.    Treatment Goals:     see above: client has missed groups for first 1 1/2 weeks of treatment     Area of Treatment Focus:  Symptom Management, Community Resources/Discharge Planning, Abstinence/Relapse Prevention, Develop / Improve Independent Living Skills, Develop Socialization / Interpersonal Relationship Skills and Cultural / Spirituality    Therapeutic Interventions/Treatment Strategies:  Support, Feedback, Structured Activity, Problem Solving, Clarification, Education, Motivational Enhancement Therapy and orientation    Response to Treatment Strategies:  Accepted Feedback, Listened, Focused on Goals, Accepted Support and Alert    Name of Group:  OT Clinic 11:00 - 11:50     Description and Outcome:  Casie  participated in group that focused on the use of a variety of structured activities to build daily living skills, assist with follow through with activities of daily living, and practice effective coping skills. She was quite late for the group and other staff informed this staff that client was late, but then having difficulty in locating the room. She had been in this room before and there is a posted schedule with room numbers on the wall. She noted that she was also late due to some family members being in the hospital with health issues and that she had not slept well the previous evening. Staff inquired re: her completed self assessment of her functioning and she noted that she had completed it the previous session and left it in the cupboard. She  Initiated her chosen activity and noted that she did want to start to engage in it for the session. She was slightly intrusive in asking for  assistance as staff was talking with new peers in the group, but she also was able to wait as asked to do so. She quickly made decisions and then focused on first step for about 10 minutes. She was also social with select peer talking about disability and community resources.  Client verbalized understanding of session by noted goal for the session with activity engagement. Will complete assessment next session.    Is this a Weekly Review of the Progress on the Treatment Plan?  No.

## 2018-04-25 ENCOUNTER — HOSPITAL ENCOUNTER (OUTPATIENT)
Dept: BEHAVIORAL HEALTH | Facility: CLINIC | Age: 28
End: 2018-04-25
Attending: NURSE PRACTITIONER
Payer: MEDICAID

## 2018-04-25 PROCEDURE — H2012 BEHAV HLTH DAY TREAT, PER HR: HCPCS

## 2018-04-26 ENCOUNTER — HOSPITAL ENCOUNTER (OUTPATIENT)
Dept: BEHAVIORAL HEALTH | Facility: CLINIC | Age: 28
End: 2018-04-26
Attending: NURSE PRACTITIONER
Payer: MEDICAID

## 2018-04-26 PROCEDURE — H2012 BEHAV HLTH DAY TREAT, PER HR: HCPCS

## 2018-04-26 NOTE — PROGRESS NOTES
Adult Mental Health Outpatient Group Therapy Progress Note     Client Initial Individualized Goals for Treatment: work on depression and anxiety.       See Initial Treatment suggestions for the client during the time between Diagnostic Assessment and completion of the Master Individualized Treatment Plan.    Treatment Goals:     Casie will attend treatment for 5 days a week/ 3 hours per day.   Paulina will take time 1-2/week in psychotherapy to identify current symptoms or stressors and to develop a coping strategy for each using skills she has braxton taught in group.   Paulina will schedule her day with sober activity, reporting follow through or difficulty to the group daily.   While in OT groups, Casie will report on a benefit of engagement in her chosen activity for the session, focusing on ways to be more positive and productive and manage her mental health more effectively, once each week.       Area of Treatment Focus:  Symptom Management, Abstinence/Relapse Prevention and Develop / Improve Independent Living Skills    Therapeutic Interventions/Treatment Strategies:  Support, Feedback, Structured Activity, Problem Solving, Clarification, Education and Motivational Enhancement Therapy    Response to Treatment Strategies:  Accepted Feedback, Listened, Focused on Goals, Accepted Support, Alert and Distracted    Name of Group:  Self Support Skills 11:00 - 11:50     Description and Outcome:  Paulina participated in group that focused on learning about the prefrontal cortex of the brain and its functions, along with learning about non-medication techniques that can help activate this part of the brain and work to improve concentration and other executive functioning skills for engagement in activities of daily living and health management. She was distracted throughout the group, initially focused on getting her beverage set with various elements in it, leaving the room to continue to do this, and then returning  and sitting slouched and focused on her drink. She did listen to the discussion at times and did make a few comments, focusing on being hungry as the topic of types of foods that can help and those to avoid. She did set a goal, when prompted, to try to practice meditation type techniques to help, focusing on one particular one that she noted she already engages in. Client verbalized understanding of session content by noting a technique that might help her and adding briefly to the discussion on some of the other techniques.  Client would benefit from additional opportunities to practice and implement content from this session.  She appears to be distracted at times and difficulty with attending to the group topic. She appears to have limited skills and uncertain re: motivation to learn more skills at this time. Will complete OT Assessment next session.    Is this a Weekly Review of the Progress on the Treatment Plan?  No

## 2018-04-27 ENCOUNTER — HOSPITAL ENCOUNTER (OUTPATIENT)
Dept: BEHAVIORAL HEALTH | Facility: CLINIC | Age: 28
End: 2018-04-27
Attending: NURSE PRACTITIONER
Payer: MEDICAID

## 2018-04-27 PROCEDURE — H2012 BEHAV HLTH DAY TREAT, PER HR: HCPCS

## 2018-04-27 PROCEDURE — 97150 GROUP THERAPEUTIC PROCEDURES: CPT | Mod: GO

## 2018-04-27 NOTE — PROGRESS NOTES
Adult Mental Health Outpatient Group Therapy Progress Note     Client Initial Individualized Goals for Treatment: work on depression and anxiety.       See Initial Treatment suggestions for the client during the time between Diagnostic Assessment and completion of the Master Individualized Treatment Plan.    Treatment Goals:          Area of Treatment Focus:  Symptom Management, Community Resources/Discharge Planning, Abstinence/Relapse Prevention, Develop / Improve Independent Living Skills, Develop Socialization / Interpersonal Relationship Skills and Cultural / Spirituality    Therapeutic Interventions/Treatment Strategies:  Support, Feedback, Structured Activity, Problem Solving, Clarification, Education, Motivational Enhancement Therapy and orientation    Response to Treatment Strategies:  Accepted Feedback, Listened, Focused on Goals, Accepted Support and Alert    Name of Group:  OT Clinic 11:00 - 11:50     Description and Outcome:  Casie  participated in group that focused on the use of a variety of structured tasks and activities to build daily living skills, assist with follow through with activities of daily living, and practice effective coping skills. She initiated her work for the session. OT Assessment completed. See it for further details. Client verbalized understanding of purpose of session by noting the benefits to concentration and self esteem through the use of her activity.      Is this a Weekly Review of the Progress on the Treatment Plan?  Yes.      Are Treatment Plan Goals being addressed?  Yes, more specific treatment goals have been set      Are Treatment Plan Strategies to Address Goals Effective?  Yes, continue treatment strategies      Are there any current contracts in place?  No

## 2018-04-27 NOTE — PROGRESS NOTES
"                                   Occupational Therapy Assessment     Patient: Casie Campos    MRN: [id number]     :1990    Age: 28 year old    Sex:female     Assessment     Mood: Depressed  Anxious  Irritable/Angry  Mood has fluctuated within the various days of treatment attended    Affect: Constricted    Thought Content:  Clear    Verbal Content: No observed deficiencies    Concentration: Inconsistent  Distracted  Client did not identify this as a problem - may be related to being in the groups and appears less engaged in some of the groups/topics for treatment    Energy Level:  Low  Needs encouragement  Notes this as a problem - describing she has less energy due to so much that she is going through    Memory:  Delayed & immediate recall intact    Following Directions: Independently follows multi-step directions    Decision Making:  Impulsive    Motivation/Procrastination:  Client did not note this as a problem; in OT Clinic she has been engaged in activities; in Goal setting, she does note some challenges with follow through at times    Planning & Problem Solving:  Needs assistance  Needs structure    Judgment:  Corrects errors  Recognizes errors    Frustration/Stress Management:  Needs assistance to manage frustrations/identify & apply coping skills    Self Awareness:  Demonstrates/expresses positive view of self  Demonstrates/expressess confidence in abilities    Interpersonal Skills: Demonstrates/reports difficulty with trust /personal boundaries  Did not identify any other problems in this area - may need to further assess    Social Supports:  Identifies adequate social supports    Time Management:  Needs assistance to organize use of time and/or structure daily activities effectively  Noted goal to \"do more \"productive positive things\"    Leisure:  Noted difficulty only with relaxation and engagement in these types of activities - will further assess    Self-Care:  Consistently maintains " adequate nutrition, sleep, exercise. ADL's    Home Management:  Independently manages meal preparation, cleaning, laundry, organization, personal/financial paperwork    Community Resources:  Noted only difficulty was transportation and challenge with getting around without a car    Employment & Education:  Unemployed    Additional Comments/Plan of Treatment Functional Goals:  Casie initially had difficulty in attending regularly. There was also an issue with her lapse of insurance, but other factors, also. She has now attended more regularly. She has been inconsistent with attention in groups, on occasion being less engaged and more focused on side conversations with select peers. In OT Clinic, she has easily engaged in structured creative tasks as she notes interest in this type of creative tasks. She demonstrated impulsivity in selecting her first activity, first choosing a simple type, noting she was doing things for her kids. She then noted other types and then was choosing them. She did then choose another type and has now been able to focus on engagement in completing these. She followed directions and has chosen some simple execution of the step work. She does tend to have slightly less attention to details and has needed some reminders for cleaning up, asking a peer to do so once as she noted she did not want to do any more of that. She is able to note the benefits of distraction from stressors and relaxation after the session.      OTR/L Signature: TRACY Sofia/L, MICHA    Date/Time: 4/27/18
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
## 2018-04-27 NOTE — PROGRESS NOTES
Adult Mental Health   Mental Health Outpatient Program Progress Note     Client Initial Individualized Goals for Treatment:work on depression and anxiety.       See Initial Treatment suggestions for the client during the time between Diagnostic Assessment and completion of the Master Individualized Treatment Plan.    Treatment Goals:    Follow Safety Plan  Abstain from Substance Use   Ask for more information, support and/or assistance as needed.  Follow up with providers/community supports as needed:   Report increases or changes in symptoms to staff.  Report any personal safety concerns to staff.   Take medications as prescribed.  Report medication changes and/or side effects to staff.  Attend and participate in groups as scheduled or notify staff if unable to do so.  Report any use of substances to staff as this may impact your symptoms and/or                      personal safety.  Notify staff if you have any other issues that need to be addressed. This may include              any current abuse / neglect / exploitation or other vulnerability.  Follow recommendations of your treatment team and discuss concerns if not in            agreement.       Area of Treatment Focus:  Symptom Management    Therapeutic Interventions/Treatment Strategies:  Support, Structured Activity and Education    Response to Treatment Strategies:  Accepted Feedback, Listened, Attentive and Alert    Name of Group:  Mindfulness Walk  Group Time:10:00-10:50      Description and Outcome:  Group was designed to discuss the use of mindfulness in every day activities such as walking. Instructions were given on the technique of mindful walking and patients were asked to participate. Group walked and practiced mindful walking by noticing their steps, their muscle in their legs, they observed 5 senses of sight, taste, touch, hear and smell, and then they were asked to notice their emotions in a mindful way and to limit judgement. After walking  activity group discussed the practice, the walk, their observations and their current emotion.     Assessment  Appearance/ Mood: Calm behavior, range in affect, stable mood throughout group. Affect was consistent with mood.  Appropriate dress, well-groomed and was cooperative within group.   Thought Process: Linear and logical, productive and goal directed. Contributed to group conversation.     Understanding of the lesson: demonstrated understanding by participating in group. Needed reinforcement to participate and continues to side-talk with another peer during group.        Is this a Weekly Review of the Progress on the Treatment Plan?  No

## 2018-04-30 ENCOUNTER — HOSPITAL ENCOUNTER (OUTPATIENT)
Dept: BEHAVIORAL HEALTH | Facility: CLINIC | Age: 28
End: 2018-04-30
Attending: NURSE PRACTITIONER
Payer: MEDICAID

## 2018-04-30 LAB
AMPHETAMINES UR QL SCN: NEGATIVE
BARBITURATES UR QL: NEGATIVE
BENZODIAZ UR QL: NEGATIVE
CANNABINOIDS UR QL SCN: POSITIVE
COCAINE UR QL: NEGATIVE
ETHANOL UR QL SCN: NEGATIVE
OPIATES UR QL SCN: NEGATIVE

## 2018-04-30 PROCEDURE — 80320 DRUG SCREEN QUANTALCOHOLS: CPT | Performed by: NURSE PRACTITIONER

## 2018-04-30 PROCEDURE — H2012 BEHAV HLTH DAY TREAT, PER HR: HCPCS

## 2018-04-30 PROCEDURE — 80349 CANNABINOIDS NATURAL: CPT | Performed by: NURSE PRACTITIONER

## 2018-04-30 PROCEDURE — 80307 DRUG TEST PRSMV CHEM ANLYZR: CPT | Performed by: NURSE PRACTITIONER

## 2018-04-30 NOTE — PROGRESS NOTES
Adult Mental Health   Mental Health Outpatient Program Progress Note     Client Initial Individualized Goals for Treatment:work on depression and anxiety.       See Initial Treatment suggestions for the client during the time between Diagnostic Assessment and completion of the Master Individualized Treatment Plan.    Treatment Goals:    Follow Safety Plan  Abstain from Substance Use   Ask for more information, support and/or assistance as needed.  Follow up with providers/community supports as needed:   Report increases or changes in symptoms to staff.  Report any personal safety concerns to staff.   Take medications as prescribed.  Report medication changes and/or side effects to staff.  Attend and participate in groups as scheduled or notify staff if unable to do so.  Report any use of substances to staff as this may impact your symptoms and/or                      personal safety.  Notify staff if you have any other issues that need to be addressed. This may include              any current abuse / neglect / exploitation or other vulnerability.  Follow recommendations of your treatment team and discuss concerns if not in            agreement.    Area of Treatment Focus:  Symptom Management, Develop / Improve Independent Living Skills, Develop Socialization / Interpersonal Relationship Skills, Cultural / Spirituality and Physical Health     Therapeutic Interventions/Treatment Strategies:  Support, Structured Activity and Education    Response to Treatment Strategies:  Listened, Attentive, Disengaged and Alert    Name of Group:  Balance   Time: 10:00-10:50  Description and Outcome:  Group is designed to explore the components of the wellness wheel. The various areas were broken down, explained and patient was to give an example on how to provide each kind of wellness. After discussion, group members were instructed to rank each category and look at the balance they have.   Assessment  Mood: Calm behavior,  range in affect, stable mood throughout group  Thought Process: Linear and logical, productive and goal directed  Behavior: Cooperative, interacted within the group, listened and was respectful of othersHad side conversations with peers on occasion but began to participate better at the end of gorup.   Understanding: Verbalized understanding and participated in group.       Is this a Weekly Review of the Progress on the Treatment Plan?  No

## 2018-04-30 NOTE — PROGRESS NOTES
Adult Mental Health Outpatient Group Therapy Progress Note     Client Initial Individualized Goals for Treatment: work on depression and anxiety.       See Initial Treatment suggestions for the client during the time between Diagnostic Assessment and completion of the Master Individualized Treatment Plan.    Treatment Goals:     Will enlist involvement of support network by attending 1 NA/AA meeting per week reporting follow through to the group during weekly goal setting.     Paulina will schedule her day with sober activity, reporting follow through or difficulty to the group daily.   Paulina will take time 1-2/week in psychotherapy to identify current symptoms or stressors and to develop a coping strategy for each using skills she has braxton taught in group.   Casie will attend treatment for 5 days a week/ 3 hours per day.        Area of Treatment Focus:  Symptom Management, Community Resources/Discharge Planning and Abstinence/Relapse Prevention    Therapeutic Interventions/Treatment Strategies:  Support, Feedback, Structured Activity, Problem Solving, Clarification, Education and Motivational Enhancement Therapy    Response to Treatment Strategies:  Accepted Feedback, Listened, Focused on Goals, Accepted Support, Distracted, and Disengaged    Name of Group:  Goal Setting 9:00 - 9:50     Description and Outcome:  Casie participated in group that focused on learning about the benefits and practicing of setting realistic goals for treatment and management of mental and chemical health, along with self reflection on accomplishments and practicing problem solving obstacles. She noted that she was tired and spent much of the group slumped in her chair with her sweatshirt up over herself, leaning her head on a peer, and having her eyes closed. She also was texting on her phone and when asked to put it away, harshly noted that she has kids and that she needed to deal with emergencies with them. She had difficulty in  "setting any specific goals to work on, just noting similar ones to the previous week in that she comes to group and stays away from \"people who smoke\". Her aftercare goal for the week was that she would get her children back when she completes this program, but did not identify anything specific re: what she needed to also focused on in order to take care of herself and be able to parent her children. Client appears to have difficulty in engaging in groups, but has attended regularly. She appears to demonstrate some disinterest in the education being provided. She did share not being on medications at this time. Will coordinate with team and assist with looking at options to assist in engagement in treatment groups.    Is this a Weekly Review of the Progress on the Treatment Plan?  No    "

## 2018-05-01 ENCOUNTER — HOSPITAL ENCOUNTER (OUTPATIENT)
Dept: BEHAVIORAL HEALTH | Facility: CLINIC | Age: 28
End: 2018-05-01
Attending: NURSE PRACTITIONER
Payer: COMMERCIAL

## 2018-05-01 PROCEDURE — 97150 GROUP THERAPEUTIC PROCEDURES: CPT | Mod: GO

## 2018-05-01 PROCEDURE — H2012 BEHAV HLTH DAY TREAT, PER HR: HCPCS

## 2018-05-01 NOTE — PROGRESS NOTES
Adult Mental Health Outpatient Group Therapy Progress Note     Client Initial Individualized Goals for Treatment: work on depression and anxiety.       See Initial Treatment suggestions for the client during the time between Diagnostic Assessment and completion of the Master Individualized Treatment Plan.    Treatment Goals:    While in OT groups, Casie will report on a benefit of engagement in her chosen activity for the session, focusing on ways to be more positive and productive and manage her mental health more effectively, once each week.  Paulina will schedule her day with sober activity, reporting follow through or difficulty to the group daily.        Area of Treatment Focus:  Symptom Management    Therapeutic Interventions/Treatment Strategies:  Support, Feedback, Structured Activity, Problem Solving, Clarification, Education, Motivational Enhancement Therapy and orientation    Response to Treatment Strategies:  Accepted Feedback, Listened, Focused on Goals, Accepted Support and Alert    Name of Group:  OT Clinic 11:00 - 11:50     Description and Outcome:  Casie  participated in group that focused on the use of a variety of structured activities and tasks and techniques to build daily living skills, assist with follow through with activities of daily living, and practice effective coping skills. She checked in with staff at beginning of group with brighter affect and energy and apologized to staff for her behavior in group on Monday. She noted getting more sleep and acknowledged, when asked, that the weekends are challenging for her due to family demands on her. She noted that she had not gotten a doctor's appointment yet to explore taking medications and noted goal to ask staff if she needed assistance in doing so. She then easily initiated her project work and focused on completing it, noting positives re: herself as she engaged in the activity and that these would be meaningful things to give to  her children. She was quite social with peers with elevated mood, but slightly more focused on sharing some traumatic incidents that had bee happening recently in her neighborhood. Client verbalized understanding of session by noting the benefit of engaging in a positive activity for building self esteem. She does appear to be slightly impulsive with her work, but has been more open to feedback.      Is this a Weekly Review of the Progress on the Treatment Plan?  No.

## 2018-05-02 ENCOUNTER — HOSPITAL ENCOUNTER (OUTPATIENT)
Dept: BEHAVIORAL HEALTH | Facility: CLINIC | Age: 28
End: 2018-05-02
Attending: NURSE PRACTITIONER
Payer: COMMERCIAL

## 2018-05-02 PROCEDURE — H2012 BEHAV HLTH DAY TREAT, PER HR: HCPCS

## 2018-05-03 ENCOUNTER — HOSPITAL ENCOUNTER (OUTPATIENT)
Dept: BEHAVIORAL HEALTH | Facility: CLINIC | Age: 28
End: 2018-05-03
Attending: NURSE PRACTITIONER
Payer: COMMERCIAL

## 2018-05-03 LAB — CANNABINOIDS UR CFM-MCNC: 124 NG/ML

## 2018-05-03 PROCEDURE — H2012 BEHAV HLTH DAY TREAT, PER HR: HCPCS

## 2018-05-03 NOTE — PROGRESS NOTES
Adult Mental Health Outpatient Group Therapy Progress Note     Client Initial Individualized Goals for Treatment: work on depression and anxiety.       See Initial Treatment suggestions for the client during the time between Diagnostic Assessment and completion of the Master Individualized Treatment Plan.    Treatment Goals:     Casie will attend treatment for 5 days a week/ 3 hours per day.   Paulina will take time 1-2/week in psychotherapy to identify current symptoms or stressors and to develop a coping strategy for each using skills she has braxton taught in group.   Paulina will schedule her day with sober activity, reporting follow through or difficulty to the group daily.   While in OT groups, Casie will report on a benefit of engagement in her chosen activity for the session, focusing on ways to be more positive and productive and manage her mental health more effectively, once each week.       Area of Treatment Focus:  Symptom Management, Abstinence/Relapse Prevention and Develop / Improve Independent Living Skills    Therapeutic Interventions/Treatment Strategies:  Support, Feedback, Structured Activity, Problem Solving, Clarification, Education and Motivational Enhancement Therapy    Response to Treatment Strategies:  Accepted Feedback, Listened, Focused on Goals, Accepted Support, Alert, and Slightly distracted    Name of Group:  Self Support Skills 11:00 - 11:50     Description and Outcome:  Paulina participated in group that focused on learning about time management techniques that can help improve functioning, manage symptoms, and assist with follow through with activities of daily living and engage in meaningful activities. She did note her challenges with using time management skills, noting being easily distracted as a primary problem for herself. She was able to note a couple of techniques that she could use to manage, focusing on being realistic with how much she could do in a time period and  find ways to work on managing procrastination. She left a few minutes early and was absent for some of those techniques explored. Will follow up with her in next session. Client verbalized understanding of session content by noting a technique that might help her and adding briefly to the discussion on some of the other techniques.      Is this a Weekly Review of the Progress on the Treatment Plan?  No

## 2018-05-04 ENCOUNTER — HOSPITAL ENCOUNTER (OUTPATIENT)
Dept: BEHAVIORAL HEALTH | Facility: CLINIC | Age: 28
End: 2018-05-04
Attending: NURSE PRACTITIONER
Payer: COMMERCIAL

## 2018-05-04 PROCEDURE — H2012 BEHAV HLTH DAY TREAT, PER HR: HCPCS

## 2018-05-04 PROCEDURE — 97150 GROUP THERAPEUTIC PROCEDURES: CPT | Mod: GO

## 2018-05-04 NOTE — PROGRESS NOTES
Adult Mental Health Outpatient Group Therapy Progress Note     Client Initial Individualized Goals for Treatment: work on depression and anxiety.       See Initial Treatment suggestions for the client during the time between Diagnostic Assessment and completion of the Master Individualized Treatment Plan.    Treatment Goals:     While in OT groups, Casie will report on a benefit of engagement in her chosen activity for the session, focusing on ways to be more positive and productive and manage her mental health more effectively, once each week.  Will enlist involvement of support network by attending 1 NA/AA meeting per week reporting follow through to the group during weekly goal setting.     Paulina will schedule her day with sober activity, reporting follow through or difficulty to the group daily.   Paulina will take time 1-2/week in psychotherapy to identify current symptoms or stressors and to develop a coping strategy for each using skills she has braxton taught in group.   Casie will attend treatment for 5 days a week/ 3 hours per day.        Area of Treatment Focus:  Symptom Management, Community Resources/Discharge Planning, Abstinence/Relapse Prevention, Develop / Improve Independent Living Skills, Develop Socialization / Interpersonal Relationship Skills and Cultural / Spirituality    Therapeutic Interventions/Treatment Strategies:  Support, Feedback, Structured Activity, Problem Solving, Clarification, Education, Motivational Enhancement Therapy and Sensory Modulation techniques    Response to Treatment Strategies:  Accepted Feedback, Listened, Focused on Goals, Accepted Support, Distracted, and Alert    Name of Group:  OT Clinic 11:00 - 11:50     Description and Outcome:  Casie  participated in group that focused on the use of a variety of structured tasks and modalities to build daily living skills, assist with follow through with activities of daily living, and practice effective coping skills.  She was initially slightly disorganized and focused on a variety of tasks as she started the group, needing slight assistance in slower her pace and practicing focusing on one thing at a time. She shared that she was more anxious and requested a sensory modulation tool to use for the session. Staff provided this, but she had difficulty in utilizing it for more thatn a few seconds and then changing to focus on something else. She was open to refocus on this technique with prompts  and then was able to focus on evaluating her completed task from previous sessions. She easily noted positive results for herself, but had some difficulty in identifying specific skills that she had used for this. As staff noted several observed, she was able to begin to recognize some of these. She needed assistance in completing her task for the session, but was open to redirection and assistance. Client verbalized understanding of purpose of session with assistance by acknowledging and being able to identify a couple of skills that she uses for management and follow through with daily living for herself and health management.    Is this a Weekly Review of the Progress on the Treatment Plan?  Yes.      Are Treatment Plan Goals being addressed?  Yes, more specific treatment goals have been set      Are Treatment Plan Strategies to Address Goals Effective?  Yes, continue treatment strategies      Are there any current contracts in place?  No

## 2018-05-04 NOTE — PROGRESS NOTES
"MICD Progress Note / Treatment Plan Review       Patient: Casie Campos   MRN: [id number]  : 1990  Age: 28 year old  Sex: female    Week of: 18-18     Client Initial Individualized Goals for Treatment: \"work on depression and anxiety\".     See Initial Treatment suggestions for the client during the time between Diagnostic Assessment and completion of the Master Individualized Treatment Plan.    Treatment Goals:  Client will notify staff when needing assistance to develop or implement a coping plan to manage suicidal or self injurious urges.  Client will use coping plan for safety, as needed.    Casie will attend treatment for 5 days a week/ 3 hours per day.     Paulina will take time 1-2/week in psychotherapy to identify current symptoms or stressors and to develop a coping strategy for each using skills she has braxton taught in group    Casie will have 0 instances of chemical use, reporting outcomes to the group weekly.       Paulina will schedule her day with sober activity, reporting follow through or difficulty to the group daily.      Will enlist involvement of support network by attending 1 NA/AA meeting per week reporting follow through to the group during weekly goal setting.       Paulina will make an appointment with an individual therapist by the target date.     Active Psychiatric Symptoms Impairing:   Thought, Mood, Behavior and Perception    Area of Treatment Focus:  Symptom Management, Personal Safety, Community Resources/Discharge Planning and Abstinence/Relapse Prevention    Treatment Strategies:   Support, Feedback, Limit/Boundaries, Safety Assessments, Structured Activity, Problem Solving, Clarification, Education, Motivational Enhancement Therapy and Cognitive Behavioral Therapy    Patient Response:  Accepted Feedback, Gave Feedback, Listened, Focused on Goals, Attentive, Accepted Support and Alert    Dimension Risk Description and Outcome:    Dimension One: Acute " "Intoxication/Withdrawal Potential   Daily Note:  5/8/2018:  Paulina reports that she has been sober since April 13th.  She denies withdrawal symptoms.  (CUONG), 5/9/2018:  Paulina reports continued sobriety.  (CUONG)  5/10/2018:  Sobriety maintained.  (CUONG)  5/11/2018:  Paulina reports continued sobriety.  (CUONG)    Dimension Two: Biomedical Conditions and Complications  Daily Note:  5/8/2018:  Reports back pain from \"history of epidurals\".  (CUONG)  5/9/2018:  Paulina reports that she is feeling \"sleepy\".  (CUONG)  5/11/2018:  No physical health concerns reported.  (CUONG)    Dimension Three: Emotional/Behavioral / Cognitive Conditions & Complications  Daily Note:  5/8/2018:  (10-10:50am):  Casie reports that she is \"feeling ok\" although she is slightly depressed.  Paulina states that she has set up an appointment to get her wisdom teeth pulled in a week and that has been taking up her focus at the present time.  She currently denies thoughts to self harm.  (CUONG)  5/9/2018: (9-9:50am):  Paulina reports that she is feeling depressed although she is looking forward to seeing her children tomorrow.  She denies thoughts to self harm and states that she is generally very tired today with minimal motivation. She opts to not take time in psychotherapy.  (CUONG)  5/10/2018:  (9-9:50am):  Paulina reports that she is feeling \"anxious and happy\" to see her children today.  She denies thoughts to self harm and states that she does not wish to take time in group.  (CUONG)  5/11/2018: (9-9:50am):  Casie states that she is feeling upbeat today as a result of getting to see her children yesterday.  Paulina reports that she is getting nervous about court as she fears not being able to get more time with them, however, her  has informed her that she is getting a \"good report\". In the meantime, Paulina is doing all she can to remain compliant with CPS's recommendations.  She currently denies thoughts to self harm.  (CUONG)    Dimension " "Four: Treatment Acceptance / Resistance  Daily Note:  5/8/2018: Autobiography (11-11:50am):  Paulina listened attentively to her peer's autobiography.  She offered supportive feedback during the discussion that followed.  (CUONG)  5/9/2018:  Needed some prompts to stay awake in group.  (CUONG) 5/9/18 MICD Education [id number] Paulina was disengaged and participated minimally in mindfulness activity. (SS) 5/10/2018 Emotions Management (10:00-10:50 AM): Paulina participated in a skills group on cognitive distortions. Paulina reviewed various types of cognitive distortions and ways to dialectically shift their thinking. Paulina minimally participated in this skills group. Paulina did not demonstrate understanding the concept of cognitive distortions and how they affect their life, and may benefit from ongoing coaching and guidance to identify her own cognitive distortions and perceptions. (MF) 5/11/2018\"  MI/CD Education:  (10-10:50am):  Paulina participated in the extension of psychotherapy during the second hour by offering supportive feedback to peers.  (CUONG)    Dimension Five: Continued Use/Relapse Prevention  Daily Note: 5/8/2018:  Remains at higher risk for relapse.  Paulina states that she struggles with cravings.  (CUONG)  5/9/2018\":  Paulina reports that she has been avoiding using peers and \"friends who want me to mess up\".  (CUONG) 5/9/2018: Relapse Prevention Group: Pt reports last use as: 4/13/18. She reports she stays busy and stays away from certain people as coping strategies. Pt participated in group discussion regarding the topic \"Bridge Burning\" with substance use. Client verbalized understanding of session content (PRACHI).  5/10/2018:  Moderate to high risk for relapse.  (CUONG)    Dimension Six: Recovery Environment  Daily Note:  5/8/2018:  Encouraged to attend an AA or NA meeting this week.  (CUONG)  5/9/2018:  No changes at this time.  (CUONG)  5/11/2018:  Staff continue to encourage Casie to attend a sober support " meeting in the community.  (CUONG)      Weekly Progress / Treatment Plan Review      Are there additional progress notes for this week? No    Are Treatment Plan Goals being addressed? Yes, continue treatment goals    Are treatment plan strategies to address goals effective? Yes, continue treatment strategies    Are there any current contracts in place? No    Client: No changes at this time.       Client: N/A    Was client case reviewed with DELISA Trevino, JARETT and/or Eleonora Cloud MD and the treatment team this week? yes    [facility]: [facility] ; Anthony Sanford MA [facility] (PRACHI), Ana Enamorado [facility] (SS) Naomi Nails [facility] [place]- Intern (MF)

## 2018-05-08 ENCOUNTER — HOSPITAL ENCOUNTER (OUTPATIENT)
Dept: BEHAVIORAL HEALTH | Facility: CLINIC | Age: 28
End: 2018-05-08
Attending: NURSE PRACTITIONER
Payer: COMMERCIAL

## 2018-05-08 PROCEDURE — H2012 BEHAV HLTH DAY TREAT, PER HR: HCPCS

## 2018-05-08 NOTE — PROGRESS NOTES
Adult Mental Health Outpatient Group Therapy Progress Note     Client Initial Individualized Goals for Treatment: work on depression and anxiety.       See Initial Treatment suggestions for the client during the time between Diagnostic Assessment and completion of the Master Individualized Treatment Plan.    Treatment Goals:    While in OT groups, Casie will report on a benefit of engagement in her chosen activity for the session, focusing on ways to be more positive and productive and manage her mental health more effectively, once each week.  Paulina will schedule her day with sober activity, reporting follow through or difficulty to the group daily.        Area of Treatment Focus:  Symptom Management    Therapeutic Interventions/Treatment Strategies:  Support, Feedback, Structured Activity, Problem Solving, Clarification, Education, Motivational Enhancement Therapy and sensory modulation techniques    Response to Treatment Strategies:  Accepted Feedback, Listened, Focused on Goals, Accepted Support and Alert    Name of Group:  Self Support Skills 9:00 - 9:50     Description and Outcome:  Casie attended and participated in an experiential Psychoeducation group where rehabilitative intervention focuses on learning, developing through practice, and generalizing independent living skills. The purpose of this group is to stabilize and manage mental health symptoms and to improve participation and function in valued roles, routines, relationships. She had some difficulty in organizing herself and focusing for more than a few minutes on any particular task, noting she was going to practice some anxiety management skills. She did note bringing in her own sensory modulation tool to try to help manage, practicing with it for only a few minutes at a time. She was social with select peers. Client verbalized understanding of session by making attempts to try some skill for management, but appears to struggle with  this.    Is this a Weekly Review of the Progress on the Treatment Plan?  No.

## 2018-05-09 ENCOUNTER — HOSPITAL ENCOUNTER (OUTPATIENT)
Dept: BEHAVIORAL HEALTH | Facility: CLINIC | Age: 28
End: 2018-05-09
Attending: NURSE PRACTITIONER
Payer: COMMERCIAL

## 2018-05-09 PROCEDURE — H2012 BEHAV HLTH DAY TREAT, PER HR: HCPCS

## 2018-05-10 ENCOUNTER — HOSPITAL ENCOUNTER (OUTPATIENT)
Dept: BEHAVIORAL HEALTH | Facility: CLINIC | Age: 28
End: 2018-05-10
Attending: NURSE PRACTITIONER
Payer: COMMERCIAL

## 2018-05-10 PROCEDURE — H2012 BEHAV HLTH DAY TREAT, PER HR: HCPCS

## 2018-05-10 NOTE — PROGRESS NOTES
Adult Mental Health Outpatient Group Therapy Progress Note     Client Initial Individualized Goals for Treatment: work on depression and anxiety.       See Initial Treatment suggestions for the client during the time between Diagnostic Assessment and completion of the Master Individualized Treatment Plan.    Treatment Goals:     Casie will attend treatment for 5 days a week/ 3 hours per day.   Paulina will take time 1-2/week in psychotherapy to identify current symptoms or stressors and to develop a coping strategy for each using skills she has braxton taught in group.   Paulina will schedule her day with sober activity, reporting follow through or difficulty to the group daily.   While in OT groups, Casie will report on a benefit of engagement in her chosen activity for the session, focusing on ways to be more positive and productive and manage her mental health more effectively, once each week.       Area of Treatment Focus:  Symptom Management, Abstinence/Relapse Prevention and Develop / Improve Independent Living Skills    Therapeutic Interventions/Treatment Strategies:  Support, Feedback, Structured Activity, Problem Solving, Clarification, Education and Motivational Enhancement Therapy    Response to Treatment Strategies:  Accepted Feedback, Listened, Focused on Goals, Accepted Support, Alert, and Slightly distracted    Name of Group:  Self Support Skills 11:00 - 11:50     Description and Outcome:  Paulina participated in group that focused on learning about leisure, benefits, and discussing challenges as it relates to both mental and chemical health. She was quiet and slightly distracted during the group and left for extended period of time, not specifically completing the handout to work on identifying specific benefits she is hoping to get out of her leisure. She seemed slightly uncertain about what she might choose for herself when asked to do so verbally, but did note several activities and focused on  only doing things that would be for her children. Client may have had some difficulty understanding the concept of benefits of leisure for her mental health management. She was able to verbalize, with prompts from a peer, which activities she does enjoy doing in her leisure.    Is this a Weekly Review of the Progress on the Treatment Plan?  No

## 2018-05-11 ENCOUNTER — HOSPITAL ENCOUNTER (OUTPATIENT)
Dept: BEHAVIORAL HEALTH | Facility: CLINIC | Age: 28
End: 2018-05-11
Attending: NURSE PRACTITIONER
Payer: COMMERCIAL

## 2018-05-11 PROCEDURE — H2012 BEHAV HLTH DAY TREAT, PER HR: HCPCS

## 2018-05-11 NOTE — PROGRESS NOTES
Adult Mental Health Outpatient Group Therapy Progress Note     Client Initial Individualized Goals for Treatment: work on depression and anxiety.       See Initial Treatment suggestions for the client during the time between Diagnostic Assessment and completion of the Master Individualized Treatment Plan.    Treatment Goals:     While in skills groups, Casie will report on a benefit of engagement in her chosen activity for the session, focusing on ways to be more positive and productive and manage her mental health more effectively, once each week.  Will enlist involvement of support network by attending 1 NA/AA meeting per week reporting follow through to the group during weekly goal setting.     Paulina will schedule her day with sober activity, reporting follow through or difficulty to the group daily.   Paulina will take time 1-2/week in psychotherapy to identify current symptoms or stressors and to develop a coping strategy for each using skills she has braxton taught in group.   Casie will attend treatment for 5 days a week/ 3 hours per day.        Area of Treatment Focus:  Symptom Management, Community Resources/Discharge Planning, Abstinence/Relapse Prevention, Develop / Improve Independent Living Skills, Develop Socialization / Interpersonal Relationship Skills and Cultural / Spirituality    Therapeutic Interventions/Treatment Strategies:  Support, Feedback, Structured Activity, Problem Solving, Clarification, Education, Motivational Enhancement Therapy and Sensory Modulation techniques    Response to Treatment Strategies:  Accepted Feedback, Listened, Focused on Goals, Accepted Support, Distracted, and Alert    Name of Group:  Self Support Skills 11:00 - 11:50     Description and Outcome:  Casie attended and participated in an experiential Psychoeducation group where rehabilitative intervention focuses on learning, developing through practice, and generalizing independent living skills. The purpose  of this group is to stabilize and manage mental health symptoms and to improve participation and function in valued roles, routines, relationships. She needed minimal staff assistance to focus on her treatment goals and choose a techniques that would help her practice. She was able to engage in the practice for brief periods of time, spending more time socializing with peers. She needed prompts to work on some time management skills in order to follow through and complete a task in the group session for herself. She was able to note that she had some positive thoughts re: herself and what she had engaged in during the session. Client verbalized understanding of purpose of session with assistance by also being able to note that she was working to manage some symptoms and practice relaxation for herself. She appears to struggle with sometimes understanding the concepts being taught and how this might relate to her managing her mental health. She focuses on just being in the program to complete it and get her children back.     Is this a Weekly Review of the Progress on the Treatment Plan?  Yes.      Are Treatment Plan Goals being addressed?  Yes, more specific treatment goals have been set      Are Treatment Plan Strategies to Address Goals Effective?  Yes, continue treatment strategies      Are there any current contracts in place?  No

## 2018-05-11 NOTE — PROGRESS NOTES
"[facility] Progress Note / Treatment Plan Review       Patient: Casie Campos   MRN: [id number]  : 1990  Age: 28 year old  Sex: female    Week of: 18-18    Client Initial Individualized Goals for Treatment: \"work on depression and anxiety\".      See Initial Treatment suggestions for the client during the time between Diagnostic Assessment and completion of the Master Individualized Treatment Plan.    Treatment Goals:  Client will notify staff when needing assistance to develop or implement a coping plan to manage suicidal or self injurious urges.  Client will use coping plan for safety, as needed.    Casie will attend treatment for 5 days a week/ 3 hours per day.     Paulina will take time 1-2/week in psychotherapy to identify current symptoms or stressors and to develop a coping strategy for each using skills she has braxton taught in group    Casie will have 0 instances of chemical use, reporting outcomes to the group weekly.       Paulina will schedule her day with sober activity, reporting follow through or difficulty to the group daily.      Will enlist involvement of support network by attending 1 NA/AA meeting per week reporting follow through to the group during weekly goal setting.       Paulina will make an appointment with an individual therapist by the target date.     Active Psychiatric Symptoms Impairing:   Thought, Mood, Behavior and Perception    Area of Treatment Focus:  Symptom Management, Personal Safety, Community Resources/Discharge Planning and Abstinence/Relapse Prevention    Treatment Strategies:   Support, Feedback, Limit/Boundaries, Safety Assessments, Structured Activity, Problem Solving, Clarification, Education, Motivational Enhancement Therapy and Cognitive Behavioral Therapy    Patient Response:  Accepted Feedback, Listened, Attentive and Alert    Dimension Risk Description and Outcome:    Dimension One: Acute Intoxication/Withdrawal Potential   Daily Note:  2018: " " Casie reports continued sobriety.  No withdrawal symptoms reported.  (CUONG), 5/15/2018:  Casie continues to maintain her sobriety.  (CUONG)  5/17/2018:  Sobriety maintained.  (CUONG)  5/18/18:  Casie reports continued sobriety.  (CUONG)    Dimension Two: Biomedical Conditions and Complications  Daily Note: 5/14/2018:  Reports chronic back pain and fatigue.  (CUONG)  5/15/2018:  No new medical concerns reported.  (CUONG)  5/17/2018:  Paulina states that she had oral surgery yesterday but she is feeling better today.  (CUONG)    Dimension Three: Emotional/Behavioral / Cognitive Conditions & Complications  Daily Note:  5/14/2018:  Paulina reports that she is feeling \"tired and depressed\" due to the Mother's Day holiday.  Paulina states that she was not able to see nor speak to her children yesterday and this was very upsetting to her.  She remains hopeful that staying on this path of treatment and sobriety will reunite her with her children soon.  Paulina currently denies thoughts to self harm.  (CUONG)  5/15/2018: (10-10:50am):  Casie reports that she is missing her children, but is feeling more upbeat and hopeful that she will get have them for overnight visits starting as soon as this weekend.  Paulina states that this gives her hope and a reason to keep focused on her recovery efforts.  She currently denies any thoughts to self harm.  (CUONG)  5/17/2018:(9-9:50am) Casie reports that she is feeling \"happy, but anxious\" today as she gets to spend time with her children who are in CPS custody.  Pualina states that she is feeling more confident about getting them back as she continues to make progress in the program.  (CUONG)  5/18/18:  Paulina reports that she is feeling \"happy and hopeful\" today.  She received a positive report from [facility] and feels confident that she will get her children back in June.  Paulina denies thoughts to self harm at this time.  (CUONG)    Dimension Four: Treatment Acceptance / Resistance  Daily Note: "  5/14/2018:  Actively involved in psychotherapy.  Offered supportive feedback to peers.  (CUONG)  5/15/2018:  Autobiography (11-11:50am):  Paulina listened attentively to her peer's autobiography.  (CUONG) 5/17/18 Emotions Management (10:00-10:50AM): Writer showed instructional video and facilitated discussion on opposite action skill. Paulina did not appear attentive during discussion and when asked about how she could apply skill was unable to identify ways. Writer assisted her in identifying how she may be using skill currently to follow through with treatment and tasks. Paulina would benefit from additional opportunities to practice and implement content from this session. (DE) 5/18/18:  Engaged in psychotherapy.  Offered supportive feedback to peers.  (CUONG)    Dimension Five: Continued Use/Relapse Prevention  Daily Note:  5/14/2018:  Avoiding using peers and venues.  (CUONG)  5/15/2018:  No changes.  (CUONG)  5/17/2018:  Moderate risk for relapse.  Using mindfulness skills that she has learned in group to help her with cravings.  (CUONG)    Dimension Six: Recovery Environment  Daily Note:  5/14/2018:  Encouraged to attend AA or NA for additional support.  (CUONG)  5/17/2018:  No changes.  (CUONG)  5/18/18:  Paulina states that she is not interested in attending AA at this time.  (CUONG)      Weekly Progress / Treatment Plan Review      Are there additional progress notes for this week? No    Are Treatment Plan Goals being addressed? Yes, continue treatment goals    Are treatment plan strategies to address goals effective? Yes, continue treatment strategies    Are there any current contracts in place? No    Client: Agrees with treatment plan changes     Client: Received copy of revised treatment plan    Was client case reviewed with DELISA Trevino, JARETT and/or Eleonora Cloud MD and the treatment team this week? yes    Staff: SHAVONNE Adam(CUONG); Nicolasa Dennis MA, [facility], LADC (DE)

## 2018-05-14 ENCOUNTER — HOSPITAL ENCOUNTER (OUTPATIENT)
Dept: BEHAVIORAL HEALTH | Facility: CLINIC | Age: 28
End: 2018-05-14
Attending: NURSE PRACTITIONER
Payer: COMMERCIAL

## 2018-05-14 PROCEDURE — H2012 BEHAV HLTH DAY TREAT, PER HR: HCPCS

## 2018-05-14 NOTE — PROGRESS NOTES
Adult Mental Health Outpatient Group Therapy Progress Note     Client Initial Individualized Goals for Treatment: work on depression and anxiety.       See Initial Treatment suggestions for the client during the time between Diagnostic Assessment and completion of the Master Individualized Treatment Plan.    Treatment Goals:     Will enlist involvement of support network by attending 1 NA/AA meeting per week reporting follow through to the group during weekly goal setting.     Paulina will schedule her day with sober activity, reporting follow through or difficulty to the group daily.   Paulina will take time 1-2/week in psychotherapy to identify current symptoms or stressors and to develop a coping strategy for each using skills she has braxton taught in group.   Casie will attend treatment for 5 days a week/ 3 hours per day.        Area of Treatment Focus:  Symptom Management, Community Resources/Discharge Planning and Abstinence/Relapse Prevention    Therapeutic Interventions/Treatment Strategies:  Support, Feedback, Structured Activity, Problem Solving, Clarification, Education and Motivational Enhancement Therapy    Response to Treatment Strategies:  Accepted Feedback, Listened, Focused on Goals, Accepted Support, Alert and Engaged for majority of group    Name of Group:  Goal Setting 9:00 - 9:50     Description and Outcome:  Casie participated in group that focused on learning about the benefits and practicing of setting realistic goals for treatment and management of mental and chemical health, along with self reflection on accomplishments and practicing problem solving obstacles. She was more engaged in the group process and was able to share more specifics and set more specific goals for herself this session. She did not continued sobriety and doing this by being around positive and sober people and thinking about her goal to parent her children again. She was able to note specific coping skills she is  "using and wanting to continue to practice these, such as coloring, writing in a diary, and singing. She also noted she is working to \"stay busy\" with various activities. She noted she is working to manage her urges and cravings by thinking about the positives that are and will happen for her. Client verbalized understanding of content of session by being more specific re: goals and skills she is trying to practice for management at this time.      Is this a Weekly Review of the Progress on the Treatment Plan?  No    "

## 2018-05-14 NOTE — PROGRESS NOTES
Adult Mental Health   Mental Health Outpatient Program Progress Note     Client Initial Individualized Goals for Treatment:work on depression and anxiety.       See Initial Treatment suggestions for the client during the time between Diagnostic Assessment and completion of the Master Individualized Treatment Plan.    Treatment Goals:    Follow Safety Plan  Abstain from Substance Use   Ask for more information, support and/or assistance as needed.  Follow up with providers/community supports as needed:   Report increases or changes in symptoms to staff.  Report any personal safety concerns to staff.   Take medications as prescribed.  Report medication changes and/or side effects to staff.  Attend and participate in groups as scheduled or notify staff if unable to do so.  Report any use of substances to staff as this may impact your symptoms and/or                      personal safety.  Notify staff if you have any other issues that need to be addressed. This may include              any current abuse / neglect / exploitation or other vulnerability.  Follow recommendations of your treatment team and discuss concerns if not in            agreement.    Area of Treatment Focus:  Symptom Management, Develop / Improve Independent Living Skills, Develop Socialization / Interpersonal Relationship Skills and Physical Health     Therapeutic Interventions/Treatment Strategies:  Support, Feedback, Structured Activity, Problem Solving, Education and Motivational Enhancement Therapy    Response to Treatment Strategies:  Accepted Feedback, Listened and Attentive    Name of Group:  Nutrition    Group Time: 10:00-10:50      Description and Outcome:  Group discussed the barriers to eating healthy. Then we discussed ways to reduce the barriers: planning, meal prep, coupons, crock-pot meals etc. Myplate and BMI was discussed. Foods , beverage and meals that are important to concentration, energy level and mood levels were discussed.   Patients were to identify healthy foods and suggest meals that could be used in their future. Education on food labels was discussed. Patient was given worksheet to assess learning and identify their own barriers to eating well. The worksheet asked the patient about their goals for nutrition. Game was played to learn about these nutrition facts.   Assessment  Appearance/ Mood: Calm behavior, range in affect, stable mood throughout group. Affect was consistent with mood.  Appropriate dress, well-groomed and was cooperative within group.   Thought Process: Linear and logical, productive and goal directed. Contributed to group conversation.   Behavior: Cooperative, interacted within the group, listened and was respectful to others. Needed prompting to participate and not look at phone.       Understanding of the lesson: verbalizes understanding by answering questions appropriately.       Is this a Weekly Review of the Progress on the Treatment Plan?  No

## 2018-05-15 ENCOUNTER — HOSPITAL ENCOUNTER (OUTPATIENT)
Dept: BEHAVIORAL HEALTH | Facility: CLINIC | Age: 28
End: 2018-05-15
Attending: NURSE PRACTITIONER
Payer: COMMERCIAL

## 2018-05-15 ENCOUNTER — HOSPITAL ENCOUNTER (OUTPATIENT)
Dept: BEHAVIORAL HEALTH | Facility: CLINIC | Age: 28
End: 2018-05-15
Attending: PSYCHIATRY & NEUROLOGY
Payer: COMMERCIAL

## 2018-05-15 PROCEDURE — 90792 PSYCH DIAG EVAL W/MED SRVCS: CPT | Performed by: PSYCHIATRY & NEUROLOGY

## 2018-05-15 PROCEDURE — H2012 BEHAV HLTH DAY TREAT, PER HR: HCPCS

## 2018-05-15 RX ORDER — FLUOXETINE 20 MG/1
20 TABLET, FILM COATED ORAL DAILY
Qty: 30 TABLET | Refills: 3 | Status: SHIPPED | OUTPATIENT
Start: 2018-05-15

## 2018-05-15 RX ORDER — TRAZODONE HYDROCHLORIDE 100 MG/1
50-100 TABLET ORAL
Qty: 30 TABLET | Refills: 3 | Status: SHIPPED | OUTPATIENT
Start: 2018-05-15

## 2018-05-15 NOTE — PROGRESS NOTES
Adult Mental Health Outpatient Group Therapy Progress Note     Client Initial Individualized Goals for Treatment: work on depression and anxiety.       See Initial Treatment suggestions for the client during the time between Diagnostic Assessment and completion of the Master Individualized Treatment Plan.    Treatment Goals:    While in OT groups, Casie will report on a benefit of engagement in her chosen activity for the session, focusing on ways to be more positive and productive and manage her mental health more effectively, once each week.  Paulina will schedule her day with sober activity, reporting follow through or difficulty to the group daily.        Area of Treatment Focus:  Symptom Management    Therapeutic Interventions/Treatment Strategies:  Support, Feedback, Structured Activity, Problem Solving, Clarification, Education, Motivational Enhancement Therapy and sensory modulation techniques    Response to Treatment Strategies:  Accepted Feedback, Listened, Focused on Goals, Accepted Support and Alert    Name of Group:  Self Support Skills 9:00 - 9:50     Description and Outcome:  Casie attended and participated in an experiential Psychoeducation group where rehabilitative intervention focuses on learning, developing through practice, and generalizing independent living skills. The purpose of this group is to stabilize and manage mental health symptoms and to improve participation and function in valued roles, routines, relationships. She was able to initiate an intervention for managing anxiety and utilized a few sensory modulation techniques for the session. She was less social with others, focusing on herself, but checking in with staff re: progress with practicing techniques. Client demonstrated understanding of session by independent engagement in the group and practice of skills.        Is this a Weekly Review of the Progress on the Treatment Plan?  No.
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
## 2018-05-17 ENCOUNTER — HOSPITAL ENCOUNTER (OUTPATIENT)
Dept: BEHAVIORAL HEALTH | Facility: CLINIC | Age: 28
End: 2018-05-17
Attending: NURSE PRACTITIONER
Payer: COMMERCIAL

## 2018-05-17 PROCEDURE — H2012 BEHAV HLTH DAY TREAT, PER HR: HCPCS

## 2018-05-17 NOTE — PROGRESS NOTES
Adult Mental Health Outpatient Group Therapy Progress Note     Client Initial Individualized Goals for Treatment: work on depression and anxiety.       See Initial Treatment suggestions for the client during the time between Diagnostic Assessment and completion of the Master Individualized Treatment Plan.    Treatment Goals:     Casie will attend treatment for 5 days a week/ 3 hours per day.   Paulina will take time 1-2/week in psychotherapy to identify current symptoms or stressors and to develop a coping strategy for each using skills she has braxton taught in group.   Paulina will schedule her day with sober activity, reporting follow through or difficulty to the group daily.   While in OT groups, Casie will report on a benefit of engagement in her chosen activity for the session, focusing on ways to be more positive and productive and manage her mental health more effectively, once each week.       Area of Treatment Focus:  Symptom Management, Abstinence/Relapse Prevention and Develop / Improve Independent Living Skills    Therapeutic Interventions/Treatment Strategies:  Support, Feedback, Structured Activity, Problem Solving, Clarification, Education and Motivational Enhancement Therapy and Sensory Modulation techniques    Response to Treatment Strategies:  Accepted Feedback, Listened, Focused on Goals, Accepted Support, Alert    Name of Group:  Self Support Skills 11:00 - 11:50     Description and Outcome:  Paulina participated in group that focused on learning about sensory input, the sensory system and then practicing sensory modulation techniques for calming, alerting, grounding, organizing and overall self regulation for management and to look for alternatives to use for this vs alcohol and other illicit chemicals. She listened to the discussion and noted understanding the content as she was able to identify some examples of sensory techniques that she has used to calm herself, but that she was not aware  that this was helping her. She was open to try a variety of techniques and noted calming benefit from the weighted blanket.       Is this a Weekly Review of the Progress on the Treatment Plan?  No

## 2018-05-18 ENCOUNTER — HOSPITAL ENCOUNTER (OUTPATIENT)
Dept: BEHAVIORAL HEALTH | Facility: CLINIC | Age: 28
End: 2018-05-18
Attending: NURSE PRACTITIONER
Payer: COMMERCIAL

## 2018-05-18 PROCEDURE — H2012 BEHAV HLTH DAY TREAT, PER HR: HCPCS

## 2018-05-18 NOTE — PROGRESS NOTES
"MICD Progress Note / Treatment Plan Review       Patient: Casie Campos   MRN: [id number]  : 1990  Age: 28 year old  Sex: female    Week of:  18-18     Client Initial Individualized Goals for Treatment: \"work on depression and anxiety\"    See Initial Treatment suggestions for the client during the time between Diagnostic Assessment and completion of the Master Individualized Treatment Plan.    Treatment Goals:  Client will notify staff when needing assistance to develop or implement a coping plan to manage suicidal or self injurious urges.  Client will use coping plan for safety, as needed.    Casie will attend treatment for 5 days a week/ 3 hours per day.     Paulina will take time 1-2/week in psychotherapy to identify current symptoms or stressors and to develop a coping strategy for each using skills she has braxton taught in group    Casie will have 0 instances of chemical use, reporting outcomes to the group weekly.       Paulina will schedule her day with sober activity, reporting follow through or difficulty to the group daily.      Will enlist involvement of support network by attending 1 NA/AA meeting per week reporting follow through to the group during weekly goal setting.       Paulina will make an appointment with an individual therapist by the target date.     Active Psychiatric Symptoms Impairing:   Thought, Mood, Behavior and Perception    Area of Treatment Focus:  Symptom Management, Personal Safety, Community Resources/Discharge Planning and Abstinence/Relapse Prevention    Treatment Strategies:   Support, Feedback, Limit/Boundaries, Safety Assessments, Structured Activity, Problem Solving, Clarification, Education, Motivational Enhancement Therapy and Cognitive Behavioral Therapy    Patient Response:  Accepted Feedback, Gave Feedback, Listened, Focused on Goals, Attentive, Accepted Support and Alert    Dimension Risk Description and Outcome:    Dimension One: Acute " "Intoxication/Withdrawal Potential   Daily Note:  5/22/2018:  Paulina reports continued sobriety.  No withdrawal symptoms reported.  (CUONG)  5/23/2018:  Sobriety maintained.  (CUONG)  5/24/2018:  Paulina continues to maintain her sobriety.  (CUONG)  5/25/18:  Sobriety maintained.  (CUONG)    Dimension Two: Biomedical Conditions and Complications  Daily Note:  5/22/2018:  Casie denies any medical issues at this time. (CUONG)  5/23/2018:  No change.  (CUONG)  5/24/2018:  Casie denies any acute or chronic concerns.  (CUONG)    Dimension Three: Emotional/Behavioral / Cognitive Conditions & Complications  Daily Note: 5/22/2018: (10-10:50am):  Paulina reports that she is feeling tired from taking the Trazodone.  She states that her mood has improved as well as her anxiety which she attributes to Fluoxetine.  Paulina states that she misses her children but feels motivated to continue to work on her treatment and recovery so that she can be reunited with them in June.  She currently denies thoughts to self harm.  (CUONG)  5/23/2018:  (9-9:50am):  Casie reports that she is feeling \"excited\" to see her chidlren tomorrow and is feeling well rested.  She reports a decrease in anxiety and denies any thoughts to self harm.  (CUONG)  5/24/2018:  (9-9:50am):  Casie reports that she is feeling positive and upbeat as she gets to see her children following treatment.  Casie continues to take her medications as prescribed and feels that they are \"still working\".  She currently denies thoughts to self harm.  (CUONG) 5/25/18: (10-10:50am):  Paulina reports that she is feeling good today.  She states that she is \"happy and jittery\" after getting to be with her children yesterday and is feeling more positive about getting them back.  Casie currently denies thoughts to self harm.  (CUONG)    Dimension Four: Treatment Acceptance / Resistance  Daily Note:  5/22/2018: Autobiography (11-11:50am):  Paulina listened attentively to her peer's " "autobiography.  She offered supportive feedback in the discussion that followed.  (CUONG)  5/23/2018:  Interpersonal Relationships (11-11:50am):  Paulina minimally participated in the group on \"Tips for Setting Boundaries\".  She states that she feels that she is making progress with her boundary setting especially with her children.  (CUONG)  5/24/18 Emotions Management (10:00-10:50AM): Writer taught and facilitated discussion on benefit of activity scheduling. Paulina was assigned homework of tracking activity and mood due next week. Paulina participated minimally in discussion and appeared to be inattentive at times. She did begin assignment and shared she would like to track anxiety.   Paulina would benefit from additional opportunities to practice and implement content from this session. (DE)     Dimension Five: Continued Use/Relapse Prevention  Daily Note:  5/22/2018:  Moderate risk for relapse.  Has been avoiding using peers and venues.  (CUONG)  5/23/2018 [id number] Relapse Prevention Group: Pt reported last use as 4/13/2018 of cannabis. She reports cravings and stays busy to cope. Pt participated minimally in discussion of the peers who wrote \"Good-Bye Letters\" to their substance of choice. She did not have her letter since she was not present for the day the assignment was given she reports. She plans to complete her letter and read it next week. Client verbalized understanding of session content (PRACHI). 5/24/2018:  Moderate risk for relapse.  Paulina has been avoiding using peers and venues and does not wish to sabotage her progress with CPS.  (CUONG) 5/25/18:  Paulina does not have any set plans for the weekend which elevates her risk for relapse.  (CUONG)    Dimension Six: Recovery Environment  Daily Note: 5/22/2018:  Has been calling family and sober peers for support.  (CUONG) 5/23/2018: Casie is encouraged to expand her sober support network.  (CUONG) 5/25/18:  Provided resources to contact an individual therapist.  " (CUONG)    Weekly Progress / Treatment Plan Review      Are there additional progress notes for this week? No    Are Treatment Plan Goals being addressed? Yes, continue treatment goals    Are treatment plan strategies to address goals effective? Yes, continue treatment strategies    Are there any current contracts in place? No    Client: Agrees with treatment plan changes     Client: Received copy of revised treatment plan    Was client case reviewed with Brenden Walls MD and/or Eleonora Cloud MD and the treatment team this week? yes    Staff: RAMIN Adam(CUONG) ; Anthony Sanford MA [facility] (PRACHI); Nicolasa Dennis MA, [facility], [facility] (DE)

## 2018-05-18 NOTE — PROGRESS NOTES
Adult Mental Health   Mental Health Outpatient Program Progress Note     Client Initial Individualized Goals for Treatment:work on depression and anxiety.       See Initial Treatment suggestions for the client during the time between Diagnostic Assessment and completion of the Master Individualized Treatment Plan.    Treatment Goals:    Follow Safety Plan  Abstain from Substance Use   Ask for more information, support and/or assistance as needed.  Follow up with providers/community supports as needed:   Report increases or changes in symptoms to staff.  Report any personal safety concerns to staff.   Take medications as prescribed.  Report medication changes and/or side effects to staff.  Attend and participate in groups as scheduled or notify staff if unable to do so.  Report any use of substances to staff as this may impact your symptoms and/or                      personal safety.  Notify staff if you have any other issues that need to be addressed. This may include              any current abuse / neglect / exploitation or other vulnerability.  Follow recommendations of your treatment team and discuss concerns if not in            agreement.       Area of Treatment Focus:  Symptom Management and Develop / Improve Independent Living Skills    Therapeutic Interventions/Treatment Strategies:  Support, Redirection, Feedback, Structured Activity and Education    Response to Treatment Strategies:  Accepted Feedback, Listened, Attentive and Alert    Name of Group:  Visual imagery  Group Time: 10:00-10:50    Description and Outcome:  Group is designed to explore mindfulness activities. This particular group looks at Visual  Imagery. Patients are given a task to draw and color a scene that brings them appeasement. Patients then complete a worksheet that walks though the 5 senses they see, taste, hear, touch and smell. Discussion was encouraged to discover more about mindfulness and strategies that remove symptoms of  anxiety and depression.  Assessment  Mood: Calm behavior, range in affect, stable mood throughout group  Thought Process: Linear and logical, productive and goal directed  Behavior: Cooperative, interacted within the group, listened and was respectful of others  Understanding: Demonstrated understanding by participating in group activity, stated she did not have a relaxing place but enjoyed music. She wrote many artists she liked listening to and this would remind her how music is calming.         Is this a Weekly Review of the Progress on the Treatment Plan?  No

## 2018-05-18 NOTE — PROGRESS NOTES
Adult Mental Health Outpatient Group Therapy Progress Note     Client Initial Individualized Goals for Treatment: work on depression and anxiety.       See Initial Treatment suggestions for the client during the time between Diagnostic Assessment and completion of the Master Individualized Treatment Plan.    Treatment Goals:     While in skills groups, Casie will report on a benefit of engagement in her chosen activity for the session, focusing on ways to be more positive and productive and manage her mental health more effectively, once each week.  Will enlist involvement of support network by attending 1 NA/AA meeting per week reporting follow through to the group during weekly goal setting.     Paulina will schedule her day with sober activity, reporting follow through or difficulty to the group daily.   Paulina will take time 1-2/week in psychotherapy to identify current symptoms or stressors and to develop a coping strategy for each using skills she has braxton taught in group.   Casie will attend treatment for 5 days a week/ 3 hours per day.        Area of Treatment Focus:  Symptom Management, Community Resources/Discharge Planning, Abstinence/Relapse Prevention, Develop / Improve Independent Living Skills, Develop Socialization / Interpersonal Relationship Skills and Cultural / Spirituality    Therapeutic Interventions/Treatment Strategies:  Support, Feedback, Structured Activity, Problem Solving, Clarification, Education, Motivational Enhancement Therapy and Sensory Modulation techniques    Response to Treatment Strategies:  Accepted Feedback, Listened, Focused on Goals, Accepted Support, and Alert    Name of Group:  Self Support Skills 11:00 - 11:50     Description and Outcome:  Casie attended and participated in an experiential Psychoeducation group where rehabilitative intervention focuses on learning, developing through practice, and generalizing independent living skills. The purpose of this group  is to stabilize and manage mental health symptoms and to improve participation and function in valued roles, routines, relationships. She was active in her initiation of skills to focus on for the session. She was engaged in focusing on this for increased time periods and was able to note her progress and how she is working on requirements for the [facility] re: return to parenting her children. She more easily focused on how she was working to have a stable home environment, staying sober, and working to engage in coping skills to manage her mental health. She noted this is the first time she has been in treatment and initially how she struggled in the groups. She is now focused on the benefits of being in the groups, learning and practicing skills and following through with various activities of daily living. She noted that she also finds being in a supportive environment with peers to be helpful for management of stressors. She noted her plans to attend 2 more weeks to gain more stability for herself. Client verbalized understanding of purpose of session by noting skills she is learning and practicing and working toward full completion of program.    Is this a Weekly Review of the Progress on the Treatment Plan?  Yes.      Are Treatment Plan Goals being addressed?  Yes, more specific treatment goals have been set      Are Treatment Plan Strategies to Address Goals Effective?  Yes, continue treatment strategies      Are there any current contracts in place?  No

## 2018-05-21 ENCOUNTER — TELEPHONE (OUTPATIENT)
Dept: BEHAVIORAL HEALTH | Facility: CLINIC | Age: 28
End: 2018-05-21

## 2018-05-21 NOTE — TELEPHONE ENCOUNTER
FELIXKristen Gallardo did not attend treatment on this date and has not called to report her absence.  I.  Writer called Paulina and left a message requesting that she call staff to check in with regard to her absence.  A.  Unable to assess.  P.  Monitor attendance.  Xiomara Rosenbaum, [facility]

## 2018-05-21 NOTE — PROGRESS NOTES
Acknowledgement of Current Treatment Plan       I have reviewed my treatment plan with my therapist / counselor on 5/16/18. I agree with the plan as it is written in the electronic health record.    Name Signature   Casie Campos    Name of Therapist / Counselor    Xiomara Rosenbaum, [facility]

## 2018-05-22 ENCOUNTER — HOSPITAL ENCOUNTER (OUTPATIENT)
Dept: BEHAVIORAL HEALTH | Facility: CLINIC | Age: 28
End: 2018-05-22
Attending: NURSE PRACTITIONER
Payer: COMMERCIAL

## 2018-05-22 PROCEDURE — H2012 BEHAV HLTH DAY TREAT, PER HR: HCPCS

## 2018-05-22 NOTE — PROGRESS NOTES
Adult Mental Health Outpatient Group Therapy Progress Note     Client Initial Individualized Goals for Treatment: work on depression and anxiety.       See Initial Treatment suggestions for the client during the time between Diagnostic Assessment and completion of the Master Individualized Treatment Plan.    Treatment Goals:    While in OT groups, Casie will report on a benefit of engagement in her chosen activity for the session, focusing on ways to be more positive and productive and manage her mental health more effectively, once each week.  Paulina will schedule her day with sober activity, reporting follow through or difficulty to the group daily.        Area of Treatment Focus:  Symptom Management    Therapeutic Interventions/Treatment Strategies:  Support, Feedback, Structured Activity, Problem Solving, Clarification, Education, Motivational Enhancement Therapy and sensory modulation techniques    Response to Treatment Strategies:  Accepted Feedback, Listened, Focused on Goals, Accepted Support and Alert    Name of Group:  Self Support Skills 9:00 - 9:50     Description and Outcome:  Casie attended and participated in an experiential Psychoeducation group where rehabilitative intervention focuses on learning, developing through practice, and generalizing independent living skills. The purpose of this group is to stabilize and manage mental health symptoms and to improve participation and function in valued roles, routines, relationships. She checked in with staff when asked about absence yesterday, noting that she had started the medications prescribed by program psychiatrist and had ended up oversleeping. She did note some continued drowsiness, but was able to choose an intervention to engage in for alerting herself this session. She also noted that she had not worked on her goodbye letter to her drug of choice assignment that is due for tomorrow's relapse prevention group and asked for assistance in  beginning that assignment. Client verbalized understanding of session by noting benefits of engagement in a variety of skill practicing for management this session. Will continue to help monitor her response to medications with impact on symptom management as this is new treatment for her.      Is this a Weekly Review of the Progress on the Treatment Plan?  No.

## 2018-05-23 ENCOUNTER — HOSPITAL ENCOUNTER (OUTPATIENT)
Dept: BEHAVIORAL HEALTH | Facility: CLINIC | Age: 28
End: 2018-05-23
Attending: NURSE PRACTITIONER
Payer: COMMERCIAL

## 2018-05-23 PROCEDURE — H2012 BEHAV HLTH DAY TREAT, PER HR: HCPCS

## 2018-05-24 ENCOUNTER — HOSPITAL ENCOUNTER (OUTPATIENT)
Dept: BEHAVIORAL HEALTH | Facility: CLINIC | Age: 28
End: 2018-05-24
Attending: NURSE PRACTITIONER
Payer: COMMERCIAL

## 2018-05-24 PROCEDURE — H2012 BEHAV HLTH DAY TREAT, PER HR: HCPCS

## 2018-05-24 NOTE — PROGRESS NOTES
"Adult Mental Health Outpatient Group Therapy Progress Note     Client Initial Individualized Goals for Treatment: work on depression and anxiety.       See Initial Treatment suggestions for the client during the time between Diagnostic Assessment and completion of the Master Individualized Treatment Plan.    Treatment Goals:     Casie will attend treatment for 5 days a week/ 3 hours per day.   Paulina will take time 1-2/week in psychotherapy to identify current symptoms or stressors and to develop a coping strategy for each using skills she has braxton taught in group.   Paulina will schedule her day with sober activity, reporting follow through or difficulty to the group daily.   While in OT groups, Casie will report on a benefit of engagement in her chosen activity for the session, focusing on ways to be more positive and productive and manage her mental health more effectively, once each week.       Area of Treatment Focus:  Symptom Management, Abstinence/Relapse Prevention and Develop / Improve Independent Living Skills    Therapeutic Interventions/Treatment Strategies:  Support, Feedback, Structured Activity, Problem Solving, Clarification, Education and Motivational Enhancement Therapy and Aftercare Coping Cards    Response to Treatment Strategies:  Accepted Feedback, Listened, Focused on Goals, Accepted Support, Distracted    Name of Group:  Life Skills 11:00 - 11:50     Description and Outcome:  Paulina  participated in group that focused on beginning to establish a specific aftercare/relaspe management plan by engaging in making an individualized \"first aid kit\" to manage mental and chemical health and potentially reduce relapses and/or the severity of relapses. She was quiet and more focused on writing a letter and then left group for a few minutes. She chose to about 15 - 20 minutes early in order to have her visit with her children. She noted that she had taken cards to begin to work on, but did not " take the handout or complete any work this session. She did note she understood the concept of having coping skills written to help for memory and when it is difficult to think of what skills to use to help manage.      Is this a Weekly Review of the Progress on the Treatment Plan?  No

## 2018-05-25 ENCOUNTER — HOSPITAL ENCOUNTER (OUTPATIENT)
Dept: BEHAVIORAL HEALTH | Facility: CLINIC | Age: 28
End: 2018-05-25
Attending: NURSE PRACTITIONER
Payer: COMMERCIAL

## 2018-05-25 PROCEDURE — H2012 BEHAV HLTH DAY TREAT, PER HR: HCPCS

## 2018-05-25 NOTE — PROGRESS NOTES
Adult Mental Health   Mental Health Outpatient Program Progress Note     Client Initial Individualized Goals for Treatment:work on depression and anxiety.       See Initial Treatment suggestions for the client during the time between Diagnostic Assessment and completion of the Master Individualized Treatment Plan.    Treatment Goals:    Follow Safety Plan  Abstain from Substance Use   Ask for more information, support and/or assistance as needed.  Follow up with providers/community supports as needed:   Report increases or changes in symptoms to staff.  Report any personal safety concerns to staff.   Take medications as prescribed.  Report medication changes and/or side effects to staff.  Attend and participate in groups as scheduled or notify staff if unable to do so.  Report any use of substances to staff as this may impact your symptoms and/or                      personal safety.  Notify staff if you have any other issues that need to be addressed. This may include              any current abuse / neglect / exploitation or other vulnerability.  Follow recommendations of your treatment team and discuss concerns if not in            agreement.       Area of Treatment Focus:  Symptom Management and Develop / Improve Independent Living Skills    Therapeutic Interventions/Treatment Strategies:  Support, Redirection, Feedback, Structured Activity and Education    Response to Treatment Strategies:  Accepted Feedback, Listened, Attentive and Alert    Name of Group:  Grounding   Group Time: 9:00-9:50  Description and Outcome:    Mindfulness group focused on the task of  grounding . Introduction of grounding was done and various types of grounding were explained. Mental, physical and soothing grounding were the three grounding techniques covered. Examples were given throughout the session and members were encouraged to discuss their insights concerning grounding and how they may use grounding in their future.  Towards the  end of the group, members were invited to participate in a memory game that could be used as a grounding exercise.  Assessment  Mood: Calm behavior, range in affect, stable mood throughout group  Thought Process: Linear and logical, productive and goal directed  Behavior: Cooperative, interacted within the group, listened and was respectful of others  Understanding: Verbalized understanding by demonstration. Stated their grounding tool they would use would be stretching.     Is this a Weekly Review of the Progress on the Treatment Plan?  No

## 2018-05-25 NOTE — PROGRESS NOTES
Adult Mental Health Outpatient Group Therapy Progress Note     Client Initial Individualized Goals for Treatment: work on depression and anxiety.       See Initial Treatment suggestions for the client during the time between Diagnostic Assessment and completion of the Master Individualized Treatment Plan.    Treatment Goals:     While in skills groups, Casie will report on a benefit of engagement in her chosen activity for the session, focusing on ways to be more positive and productive and manage her mental health more effectively, once each week.  Will enlist involvement of support network by attending 1 NA/AA meeting per week reporting follow through to the group during weekly goal setting.     Paulina will schedule her day with sober activity, reporting follow through or difficulty to the group daily.   Paulina will take time 1-2/week in psychotherapy to identify current symptoms or stressors and to develop a coping strategy for each using skills she has braxton taught in group.   Casie will attend treatment for 5 days a week/ 3 hours per day.        Area of Treatment Focus:  Symptom Management, Community Resources/Discharge Planning, Abstinence/Relapse Prevention, Develop / Improve Independent Living Skills, Develop Socialization / Interpersonal Relationship Skills and Cultural / Spirituality    Therapeutic Interventions/Treatment Strategies:  Support, Feedback, Structured Activity, Problem Solving, Clarification, Education, Motivational Enhancement Therapy and Sensory Modulation techniques    Response to Treatment Strategies:  Accepted Feedback, Listened, Focused on Goals, Accepted Support, and Alert    Name of Group:  Self Support Skills 11:00 - 11:50     Description and Outcome:  Casie attended and participated in an experiential Psychoeducation group where rehabilitative intervention focuses on learning, developing through practice, and generalizing independent living skills. The purpose of this group  is to stabilize and manage mental health symptoms and to improve participation and function in valued roles, routines, relationships. She was generally focused on continued work on writing various things to others, noting the benefits of communicating and expressing herself. She did note having a very good visit with her children the previous day and that she is working to follow through with expectations she has for having them possible return to her for full time. Client verbalized understanding of purpose of session by noting the benefits of practicing her communication skills.    Is this a Weekly Review of the Progress on the Treatment Plan?  Yes.      Are Treatment Plan Goals being addressed?  Yes, more specific treatment goals have been set      Are Treatment Plan Strategies to Address Goals Effective?  Yes, continue treatment strategies      Are there any current contracts in place?  No

## 2018-05-29 ENCOUNTER — TELEPHONE (OUTPATIENT)
Dept: BEHAVIORAL HEALTH | Facility: CLINIC | Age: 28
End: 2018-05-29
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
## 2018-05-29 NOTE — TELEPHONE ENCOUNTER
FELIXKristen Jason did not attend treatment on this date and has not called to report her absence.  I.  Writer called Paulina and left a message requesting a return call to check in with staff.  A. Unable to assess.  P.  Monitor attendance.  Xiomara Rosenbaum, [facility]

## 2018-05-30 ENCOUNTER — TELEPHONE (OUTPATIENT)
Dept: BEHAVIORAL HEALTH | Facility: CLINIC | Age: 28
End: 2018-05-30

## 2018-05-30 NOTE — TELEPHONE ENCOUNTER
MAYA  Paulina did not attend treatment on this date and has not called to report her absence.  I.  Writer called Paulina leaving a message asking for a return call to check in with staff.  A.  Unable to assess.  P.  Call emergency contact if Paulina does not return writer's call.  Xiomara Rosenbaum, [facility]

## 2018-05-31 ENCOUNTER — HOSPITAL ENCOUNTER (OUTPATIENT)
Dept: BEHAVIORAL HEALTH | Facility: CLINIC | Age: 28
End: 2018-05-31
Attending: NURSE PRACTITIONER
Payer: COMMERCIAL

## 2018-05-31 PROCEDURE — H2012 BEHAV HLTH DAY TREAT, PER HR: HCPCS

## 2018-06-01 ENCOUNTER — HOSPITAL ENCOUNTER (OUTPATIENT)
Dept: BEHAVIORAL HEALTH | Facility: CLINIC | Age: 28
End: 2018-06-01
Attending: NURSE PRACTITIONER
Payer: COMMERCIAL

## 2018-06-01 PROCEDURE — H2012 BEHAV HLTH DAY TREAT, PER HR: HCPCS

## 2018-06-01 ASSESSMENT — ANXIETY QUESTIONNAIRES
GAD7 TOTAL SCORE: 7
IF YOU CHECKED OFF ANY PROBLEMS ON THIS QUESTIONNAIRE, HOW DIFFICULT HAVE THESE PROBLEMS MADE IT FOR YOU TO DO YOUR WORK, TAKE CARE OF THINGS AT HOME, OR GET ALONG WITH OTHER PEOPLE: SOMEWHAT DIFFICULT
5. BEING SO RESTLESS THAT IT IS HARD TO SIT STILL: SEVERAL DAYS
1. FEELING NERVOUS, ANXIOUS, OR ON EDGE: SEVERAL DAYS
3. WORRYING TOO MUCH ABOUT DIFFERENT THINGS: SEVERAL DAYS
6. BECOMING EASILY ANNOYED OR IRRITABLE: SEVERAL DAYS
2. NOT BEING ABLE TO STOP OR CONTROL WORRYING: SEVERAL DAYS
7. FEELING AFRAID AS IF SOMETHING AWFUL MIGHT HAPPEN: SEVERAL DAYS

## 2018-06-01 ASSESSMENT — PATIENT HEALTH QUESTIONNAIRE - PHQ9: 5. POOR APPETITE OR OVEREATING: SEVERAL DAYS

## 2018-06-01 NOTE — PROGRESS NOTES
Adult Mental Health Outpatient Group Therapy Progress Note     Client Initial Individualized Goals for Treatment: work on depression and anxiety.         See Initial Treatment suggestions for the client during the time between Diagnostic Assessment and completion of the Master Individualized Treatment Plan.    Treatment Goals:     While in skills groups, Casie will report on a benefit of engagement in her chosen activity for the session, focusing on ways to be more positive and productive and manage her mental health more effectively, once each week.  Paulina will schedule her day with sober activity, reporting follow through or difficulty to the group daily.        Area of Treatment Focus:  Symptom Management, Community Resources/Discharge Planning and Abstinence/Relapse Prevention    Therapeutic Interventions/Treatment Strategies:  Support, Feedback, Structured Activity, Problem Solving, Clarification, Education and Motivational Enhancement Therapy    Response to Treatment Strategies:  Accepted Feedback, Listened, Focused on Goals, Accepted Support and Alert    Name of Group:  Life Skills 10:00 - 10:50     Description and Outcome:  Casie attended and participated in an experiential Psychoeducation group where rehabilitative intervention focuses on learning, developing through practice, and generalizing independent living skills. The purpose of this group is to stabilize and manage mental health symptoms and to improve participation and function in valued roles, routines, relationships. She spent most of session completing some discharge work and being on her phone connecting with others. She did check in with staff re: her plan and goal to continue to use her coping skills to manage and work to think about how things she does might impact her ability to take care of her kids. She did note long term goal to try to stay sober.  Client verbalized understanding of session content by noting the benefits of being  in treatment and a few coping skills she can use to manage. She did make some progress while in the program, but would benefit from continued therapy work for management and continued practice in learning and practicing effective and positive coping skills.        Is this a Weekly Review of the Progress on the Treatment Plan?  Yes.      Are Treatment Plan Goals being addressed?  Client discharged      Are Treatment Plan Strategies to Address Goals Effective?  Client discharged      Are there any current contracts in place?  No

## 2018-06-01 NOTE — DISCHARGE SUMMARY
MICD Discharge Summary/Instructions     Patient: Casie Campos  MRN: [id number]   : 1990 Age: 28 year old Sex: female   -  Focus of Treatment / Discharge Recommendations    Personal Safety/ Management of Symptoms    * Follow your safety plan.  Report increased symptoms to your care team at [facility] and /or go to the nearest Emergency Department.    * Call crisis lines as needed    [facility] [phone]                [facility] [phone]  [facility] [phone]              [facility] [phone]  [facility] [phone]              [facility] [phone]  [facility] [phone]          National Suicide Prevention [phone]  [facility] [phone]            Suicide Prevention [phone]  [facility] [phone]    Abstinence/Relapse Prevention  * Take all medicines as directed.  Carry a current list of medicines with you.  * Use coping skills: Grounding, Mindfulness, Meditation, Urge Surfing, AA/NA, Call a sober peer  * Do not use illicit (street) drugs, controlled substances (narcotics) or alcohol.    Develop/Improve Independent Living/Socialization Skills: Expand Sober Support Network    Community Resources/Supports and Discharge Planning:    Follow up with psychiatrist / main caregiver: per [facility]   Next visit: Call to schedule [phone]    Follow up with your therapist: At [facility]   Next visit: Call to schedule    Go to group therapy and / or support groups at: NA, AA, [facility],     See your medical doctor about:  Annual Physicals    Other:  Casie has successfully completed the Adult Dual Diagnosis Program.  She will follow up with an Individual Therapist for aftercare at [facility].  If you have questions or need additional resources, please contact Xiomara Rosenbaum at [phone].    Your treatment team appreciates having the opportunity to work with you and wishes  you the best.    Client Signature:_______________________   Date / Time:___________  SHAVONNE Adam

## 2018-06-02 ASSESSMENT — ANXIETY QUESTIONNAIRES: GAD7 TOTAL SCORE: 7

## 2018-06-02 ASSESSMENT — PATIENT HEALTH QUESTIONNAIRE - PHQ9: SUM OF ALL RESPONSES TO PHQ QUESTIONS 1-9: 9

## 2018-06-04 NOTE — DISCHARGE SUMMARY
"  Adult MICD Discharge Summary / Instructions Adult MICD Discharge Summary / Instruction     Patient: Casie Campos MRN: [id number]  : 1990 Age:  28 year old Sex:  female    Admission Date: 18 Discharge Date: 18    Reason for Discharge: Completed treatment         Prognosis: Guarded      Client Progress Toward AchievingTreatment Plan Goals / Dimensions Risk Scale       Casie attended 82 of 126 scheduled MICD groups. Absences were due to oversleeping, appointments, lack of insurance (for one week) and \"no call, no show\".  Casie was referred to the program by CPS after losing custody of her three youngest children.  \"Paulina\" was court ordered to complete the program before being reunited with her children.  Paulina had a rough start to the program as she lost her funding shortly after beginning the program.  Staff held her spot for 1 week until she was able to get her insurance reinstated.  At that point, Paulina exhibited better attendance and increased participation in the program.  Paulina was able to learn several new coping skills including opposite action, mindfulness techniques, grounding, and asking for help.  Paulina was open to attending AA initially with her peers from group but lost interest in community based support groups half-way through the program.  Staff still recommend that Paulina find some sort of sober support in community following her completion of the program.  She will be following up with [facility] for aftercare but would still benefit from expanding her sober support network.        Diagnosis:   DSM5 Diagnosis  296.22 (F32.1)  Major Depressive Disorder, Single Episode, Moderate _  309.81 (F43.10) Posttraumatic Stress Disorder (includes Posttraumatic Stress Disorder for Children 6 Years and Younger)  Without dissociative symptoms  Substance-Related & Addictive Disorders 304.30 (F12.20) Cannabis Use Disorder Severe  .    Individualized Treatment Plan " "Goals/Progress:          Area of Treatment Focus:   Personal Safety  Start Date:    4/18/2018    Dimension:   III. Emotional / Behavioral Condition    Description:    Casie states that she has moments when she \"does not wish to be here\" however she denies any suicidal ideation or thoughts to self harm at this time.      Goal:  Target Date: 5/16/18, 6/13/18 Status: Completed  Client will notify staff when needing assistance to develop or implement a coping plan to manage suicidal or self injurious urges.  Client will use coping plan for safety, as needed.      Progress:   5/16/18:  Casie denies any recent thoughts to self harm.  Goal CONTINUED until she completes the program.      6/1/18:  Goal COMPLETED.  Casie denies any recent thoughts to self harm and has successfully completed the program.      Treatment Strategies:   Assist clients in establishing / strengthening support network  Assist to identify treatment goals  Assess / reassess for appropriate therapy program involvement, encourage participation in therapies  Assess / reassess level of potential for harm to self or others  Engage in safety planning when indicated  Facilitate increased self awareness  Provide feedback about social skills  Teach adaptive coping skills and communication skills        Area of Treatment Focus:   Symptom Stabilization and Management  Start Date:    4/18/18    Dimension:   II. Biomedical Conditions and III. Emotional / Behavioral Condition    Description:    Casie's symptoms include anxiety, depression, poor memory, low appetite, flashbacks, and nightmares.  She also reports difficulty with managing frustration and effectively using her time, having desire to try to engage in more productive and positive activities.    Goal:  Target Date: 5/16/18, 6/13/18 Status: Completed  Casie will attend treatment for 5 days a week/ 3 hours per day. (COMPLETED)    Paulina will take time 1-2/week in psychotherapy to identify " current symptoms or stressors and to develop a coping strategy for each using skills she has braxton taught in group.      While in skills groups, Casie will report on a benefit of engagement in her chosen activity for the session, focusing on ways to be more positive and productive and manage her mental health more effectively, once each week.      Progress:   5/16/18  #1:  Paulina has greatly improved her attendance and has COMPLETED this goal.      #2:  Paulina is making progress with her ability to communicate her symptoms and stressors in addition to identifying the coping strategies that she finds most helpful.  Paulina states that she is working on mindfulness and calling supportive people when she needs help.  Goal CONTINUED per Paulina's request for further development with this goal.      #3: Paulina is making progress with this goal, being able to initiate more activities for herself and working to engage in more positive and overall productive activities. She is able to note how some of these activities are coping skills for herself and managing her mental health. CONTINUE goal for further development.    6/1/18  #2:  Paulina reports that she is using a wide variety of skills that she has learned from the mindfulness and Emotions Management groups while in the program.  She will continue to work on these skills with her individual therapist.  Goal COMPLETED.      #3: Goal COMPLETED, although Paulina was less engaged in learning and engagement in activities for health management as she began to complete treatment. She also had decreased attendance again.    Treatment Strategies:   Assist clients in establishing / strengthening support network  Assist to identify treatment goals  Assess / reassess for appropriate therapy program involvement, encourage participation in therapies  Assess / reassess level of potential for harm to self or others  Engage in safety planning when indicated  Facilitate increased self  awareness  Teach adaptive coping skills and communication skills   Provide a variety of structured activities to build daily living skills and manage symptoms  Educate and provide opportunity to practice self regulation through the use of sensory modulation techniques        Area of Treatment Focus:   Abstinence / Relapse Prevention  Start Date:    4/18/2018    Dimension:   I. Acute Intoxication / Withdrawal Potential, IV. Treatment Acceptance / Resistance and V. Relapse    Description:   Juliets use of chemicals resulted in child custody issues and has exacerbated her mental health symptoms.      Goal:  Target Date: 5/16/18, 6/13/18 Status: Completed/stopped  aCsie will have 0 instances of chemical use, reporting outcomes to the group weekly.       Paulina will schedule her day with sober activity, reporting follow through or difficulty to the group daily.        Progress:   5/16/18  #1: Casie has maintained her sobriety for over one month.  Goal  CONTINUED.      #2: Paulina is making progress with this as she has begun to focus on reporting on engaging in doing this more consistently. Will CONTINUE goal for further development and look to expand types of sober activities she can engage in.    6/1/18:  #1:  Paulina has maintained her sobriety for the past 6 weeks.  Goal COMPLETED.    #2:  Paulina has had some struggle with finding sober support and is reluctant to attend AA or NA.  Goal STOPPED.      Treatment Strategies:   Assist clients in establishing / strengthening support network  Assist to identify treatment goals  Engage in safety planning when indicated  Facilitate increased self awareness  Provide education regarding relapse prevention  Teach adaptive coping skills and communication skills        Area of Treatment Focus:   Community Resources / Support and Discharge Planning  Start Date:    4/18/2018      Dimension:   VI. Recovery Environment    Description:    Casie would like to expand her  sober support network as she has few sober friends and her family lives out of town.      Goal:  Target Date: 5/16/18, 6/13/18 Status: Completed  Will enlist involvement of support network by attending 1 NA/AA meeting per week reporting follow through to the group during weekly goal setting.       Paulina will make an appointment with an individual therapist by the target date.      Progress:   5/16/18:  #1:  Paulina attended several AA meetings since the start of treatment.  She feels that she has COMPLETED this goal.      #2: Goal CONTINUED.  Paulina still needs to make an appointment with an individual therapist.    6/1/18:    Goal #2:  Paulina has made an appointment with a therapist at the [facility].  Goal COMPLETED.        Treatment Strategies:   Assist clients in establishing / strengthening support network  Assist to identify treatment goals  Assist with discharge planning  Facilitate increased self awareness  Teach adaptive coping skills and communication skills          Admit Discharge   PHQ-9 15 9   [id number] 14 7       DIMENSION  ADMIT RATING DISCHARGE RATING   1 Acute Intoxication / Withdrawal Potential 0 0   2 Biomedical Conditions & Complications 1 1   3 Emotional / Behavioral / Cognitive Conditions & Complications 2 1   4 Treatment Acceptance / Resistance: 1 1   5 Continued Use / Relapse Prevention 3 2   6 Recovery Environment 1 1       Additional Comments: Casie has successfully completed the program and is encouraged to work with her therapist at [facility] and to attend a sober support group in the community about 2-3 times per week for additional support.      Completed By: RAMIN [email or web address]

## 2018-11-07 ENCOUNTER — TELEPHONE (OUTPATIENT)
Dept: PSYCHIATRY | Facility: CLINIC | Age: 28
End: 2018-11-07
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
## 2019-10-02 ENCOUNTER — TRANSFERRED RECORDS (OUTPATIENT)
Dept: HEALTH INFORMATION MANAGEMENT | Facility: CLINIC | Age: 29
End: 2019-10-02

## 2020-06-11 ENCOUNTER — OFFICE VISIT (OUTPATIENT)
Dept: ADDICTION MEDICINE | Facility: CLINIC | Age: 30
End: 2020-06-11
Payer: COMMERCIAL

## 2020-06-11 DIAGNOSIS — F12.10 MARIJUANA ABUSE: Primary | ICD-10-CM

## 2020-06-11 NOTE — PATIENT INSTRUCTIONS
6/11 Met with Casie today in the field she wasn't doing well we were able to get thru her [facility] intake she express so many concerns she had for her life she really wants to get sober and do the next right things for her children we talk about maybe she needed to do a outpatient program, so she has something to focus on everyday, right now her children are in foster care so she's feeling a little lost but also frustrated with herself because she was doing really well after leaving treatment, and she made a decision to use again, she seem's excited to be apart of the MAT Program were she can get the support and help she needs, I will be following up with her again next week with a resource for housing

## 2020-06-16 ENCOUNTER — TELEPHONE (OUTPATIENT)
Dept: ADDICTION MEDICINE | Facility: CLINIC | Age: 30
End: 2020-06-16

## 2020-06-16 NOTE — TELEPHONE ENCOUNTER
6/16 Reached out to Casie she didn't answer so I left her a voice mail, asking her to call me back so we can schedule another [facility]  visit

## 2020-06-19 ENCOUNTER — TELEPHONE (OUTPATIENT)
Dept: ADDICTION MEDICINE | Facility: CLINIC | Age: 30
End: 2020-06-19

## 2020-06-19 NOTE — TELEPHONE ENCOUNTER
6/19 Left message for Casie to set up a time and day for a field visit didn't get to speak with her so I left her a voice message for her to reach out to me will try back next week

## 2020-06-22 ENCOUNTER — TELEPHONE (OUTPATIENT)
Dept: ADDICTION MEDICINE | Facility: CLINIC | Age: 30
End: 2020-06-22

## 2020-06-22 NOTE — TELEPHONE ENCOUNTER
6/22 Left  for Casie asking her to give me a call told her I was worried because I haven't heard from her if I don't hear from her I'll reach out next week or maybe just stop by at her address

## 2020-06-26 ENCOUNTER — APPOINTMENT (OUTPATIENT)
Dept: ADDICTION MEDICINE | Facility: CLINIC | Age: 30
End: 2020-06-26
Payer: COMMERCIAL

## 2020-07-02 ENCOUNTER — APPOINTMENT (OUTPATIENT)
Dept: ADDICTION MEDICINE | Facility: CLINIC | Age: 30
End: 2020-07-02
Payer: COMMERCIAL

## 2020-07-22 ENCOUNTER — TELEPHONE (OUTPATIENT)
Dept: ADDICTION MEDICINE | Facility: CLINIC | Age: 30
End: 2020-07-22

## 2020-07-22 NOTE — TELEPHONE ENCOUNTER
Reached out to Casie to inform them that Yu would be on a temporary medical leave of absence and would be returning soon. I informed that they could continue to reach out to her cell phone and we will have someone there for support in the meantime. I also asked if they currently needed any assistance with scheduling appointments, finding meetings, or just wanted to talk about anything that is going on in life right now.    She stated that everything was great right now and would reach out if she needed anything.

## 2020-11-29 ENCOUNTER — HEALTH MAINTENANCE LETTER (OUTPATIENT)
Age: 30
End: 2020-11-29

## 2021-09-25 ENCOUNTER — HEALTH MAINTENANCE LETTER (OUTPATIENT)
Age: 31
End: 2021-09-25

## 2022-01-15 ENCOUNTER — HEALTH MAINTENANCE LETTER (OUTPATIENT)
Age: 32
End: 2022-01-15

## 2022-12-26 ENCOUNTER — HEALTH MAINTENANCE LETTER (OUTPATIENT)
Age: 32
End: 2022-12-26

## 2023-04-16 ENCOUNTER — HEALTH MAINTENANCE LETTER (OUTPATIENT)
Age: 33
End: 2023-04-16

## 2024-10-07 ENCOUNTER — OFFICE VISIT (OUTPATIENT)
Dept: URGENT CARE | Facility: URGENT CARE | Age: 34
End: 2024-10-07
Payer: COMMERCIAL

## 2024-10-07 VITALS
OXYGEN SATURATION: 99 % | BODY MASS INDEX: 22.81 KG/M2 | TEMPERATURE: 98.2 F | SYSTOLIC BLOOD PRESSURE: 105 MMHG | HEART RATE: 79 BPM | DIASTOLIC BLOOD PRESSURE: 70 MMHG | WEIGHT: 132.9 LBS

## 2024-10-07 DIAGNOSIS — M54.50 ACUTE EXACERBATION OF CHRONIC LOW BACK PAIN: Primary | ICD-10-CM

## 2024-10-07 DIAGNOSIS — G89.29 ACUTE EXACERBATION OF CHRONIC LOW BACK PAIN: Primary | ICD-10-CM

## 2024-10-07 PROCEDURE — 96372 THER/PROPH/DIAG INJ SC/IM: CPT | Performed by: PHYSICIAN ASSISTANT

## 2024-10-07 PROCEDURE — 99204 OFFICE O/P NEW MOD 45 MIN: CPT | Mod: 25 | Performed by: PHYSICIAN ASSISTANT

## 2024-10-07 RX ORDER — KETOROLAC TROMETHAMINE 30 MG/ML
30 INJECTION, SOLUTION INTRAMUSCULAR; INTRAVENOUS ONCE
Status: COMPLETED | OUTPATIENT
Start: 2024-10-07 | End: 2024-10-07

## 2024-10-07 RX ADMIN — KETOROLAC TROMETHAMINE 30 MG: 30 INJECTION, SOLUTION INTRAMUSCULAR; INTRAVENOUS at 17:04

## 2024-10-07 NOTE — LETTER
October 7, 2024      Casie Campos  [id number]  [place] [address]        To Whom It May Concern:    Casie Campos was seen in our clinic. She may return to work with the following: limited to light duty - lifting no greater than 20 pounds and alternate sitting and standing as needed during shift. No more than a total of 3 hours standing during a shift.     Please apply these limitations for seven days.       Sincerely,      Shahnaz Daniels PA-C

## 2024-10-07 NOTE — PROGRESS NOTES
Assessment & Plan:        ICD-10-CM    1. Acute exacerbation of chronic low back pain  M54.50 ketorolac (TORADOL) injection 30 mg    G89.29             Plan/Clinical Decision Making:    Patient with history of chronic back pain with flare up  for the past several days. Recently seen in ED. No saddle paresthesias.  No bladder or bowel changes. No hx of injury or trauma. Pain with ROM. Normal neurological exam.   Responded well to Toradol, injection given during visit today. Can continue with meds given in ED as needed. Ice, stretches.     Notes done for work:  Casie Campos  was seen today in urgent care.  Please excuse her from work today, tomorrow and Wednesday due to injury. When she returns please apply limitations in other letter.     Casie Campos was seen in our clinic. She may return to work with the following: limited to light duty - lifting no greater than 20 pounds and alternate sitting and standing as needed during shift. No more than a total of 3 hours standing during a shift.     Return if symptoms worsen or fail to improve, for in 5-7 days.     At the end of the encounter, I discussed results, diagnosis, medications. Discussed red flags for immediate return to clinic/ER, as well as indications for follow up if no improvement. Patient understood and agreed to plan. Patient was stable for discharge.        Shahnaz Daniels PA-C on 10/7/2024 at 4:45 PM          Subjective:     HPI:    Paulina is a 34 year old female who presents to clinic today for the following health issues:  Chief Complaint   Patient presents with    Back Pain     History of back pain after 4 epidurals. Seen through [facility] and needs more time off from work. Pain is bothering her walking. Took Tylenol and Naproxen for the pain,     HPI    Patient was seen in ED on 10/2/24 at [facility] for back pain for acute on chronic low back pain.   Treated with Toradol in ED. Was given work note.     Having flare up of low back pain. Hx of back  pain for over 10 years. No acute injury or trauma. No fever or illness. No paresthesias. No radiating pain in legs.   Pain has been persistent. Hard to go to work.     ED notes:   You were seen in the Emergency Department for concern of low back pain. I recommend taking tylenol every 4-6 hours and naproxen twice daily for your symptoms. I also recommend lidocaine patches, resting, gentle stretches, and heating pads fro symptoms. I have placed a referral for you to follow up with physical therapy for further management of your symptoms. Please do not hesitate to return to the ED if any of your symptoms worsen, notice numbness or tingling, loss of bowel or urinary function.  Electronically signed by Flory Mariano PA-C at 10/02/2024 2:31 PM CDT       Review of Systems  No fever.   No saddle paresthesias.   No bowel or bladder incontinence.     There is no problem list on file for this patient.       No past medical history on file.    Social History     Tobacco Use    Smoking status: Former     Types: Cigarettes    Smokeless tobacco: Not on file   Substance Use Topics    Alcohol use: Not on file             Objective:     Vitals:    10/07/24 1629   BP: 105/70   BP Location: Left arm   Patient Position: Sitting   Cuff Size: Adult Regular   Pulse: 79   Temp: 98.2  F (36.8  C)   TempSrc: Tympanic   SpO2: 99%   Weight: 60.3 kg (132 lb 14.4 oz)         Physical Exam   EXAM:   Pleasant, alert, appropriate appearance. NAD.  Head Exam: Normocephalic, atraumatic.  Back: pain with ROM. Nl heel/toe, negative passive leg raise. Mild tenderness lower back.   Neuro: CN II-XII intact grossly intact.  Skin: no rash or lesion.      Results:  No results found for any visits on 10/07/24.

## 2024-10-07 NOTE — LETTER
October 7, 2024      Casie Campos  [id number]  [place] [address]        To Whom It May Concern:    Casie Campos  was seen today in urgent care.  Please excuse her from work today, tomorrow and Wednesday due to injury. When she returns please apply limitations in other letter.         Sincerely,        Shahnaz Daniels PA-C

## 2024-11-10 ENCOUNTER — HEALTH MAINTENANCE LETTER (OUTPATIENT)
Age: 34
End: 2024-11-10

## 2025-01-23 ENCOUNTER — OFFICE VISIT (OUTPATIENT)
Dept: URGENT CARE | Facility: URGENT CARE | Age: 35
End: 2025-01-23

## 2025-01-23 VITALS
DIASTOLIC BLOOD PRESSURE: 76 MMHG | BODY MASS INDEX: 24.2 KG/M2 | RESPIRATION RATE: 16 BRPM | OXYGEN SATURATION: 99 % | HEART RATE: 89 BPM | SYSTOLIC BLOOD PRESSURE: 127 MMHG | TEMPERATURE: 98.4 F | WEIGHT: 141 LBS

## 2025-01-23 DIAGNOSIS — N92.6 MISSED MENSES: ICD-10-CM

## 2025-01-23 DIAGNOSIS — N76.0 BV (BACTERIAL VAGINOSIS): Primary | ICD-10-CM

## 2025-01-23 DIAGNOSIS — N89.8 VAGINAL DISCHARGE: ICD-10-CM

## 2025-01-23 DIAGNOSIS — B96.89 BV (BACTERIAL VAGINOSIS): Primary | ICD-10-CM

## 2025-01-23 DIAGNOSIS — R30.0 DYSURIA: ICD-10-CM

## 2025-01-23 LAB
ALBUMIN UR-MCNC: ABNORMAL MG/DL
APPEARANCE UR: ABNORMAL
BACTERIA #/AREA URNS HPF: ABNORMAL /HPF
BILIRUB UR QL STRIP: NEGATIVE
CLUE CELLS: PRESENT
COLOR UR AUTO: YELLOW
GLUCOSE UR STRIP-MCNC: NEGATIVE MG/DL
HCG UR QL: NEGATIVE
HGB UR QL STRIP: NEGATIVE
KETONES UR STRIP-MCNC: NEGATIVE MG/DL
LEUKOCYTE ESTERASE UR QL STRIP: NEGATIVE
MUCOUS THREADS #/AREA URNS LPF: PRESENT /LPF
NITRATE UR QL: NEGATIVE
PH UR STRIP: 7 [PH] (ref 5–7)
RBC #/AREA URNS AUTO: ABNORMAL /HPF
SP GR UR STRIP: 1.02 (ref 1–1.03)
SQUAMOUS #/AREA URNS AUTO: ABNORMAL /LPF
TRICHOMONAS, WET PREP: ABNORMAL
UROBILINOGEN UR STRIP-ACNC: 1 E.U./DL
WBC #/AREA URNS AUTO: ABNORMAL /HPF
WBC'S/HIGH POWER FIELD, WET PREP: ABNORMAL
YEAST, WET PREP: ABNORMAL

## 2025-01-23 RX ORDER — METRONIDAZOLE 500 MG/1
500 TABLET ORAL 2 TIMES DAILY
Qty: 14 TABLET | Refills: 0 | Status: SHIPPED | OUTPATIENT
Start: 2025-01-23 | End: 2025-01-23

## 2025-01-23 RX ORDER — METRONIDAZOLE 500 MG/1
500 TABLET ORAL 2 TIMES DAILY
Qty: 14 TABLET | Refills: 0 | Status: SHIPPED | OUTPATIENT
Start: 2025-01-23

## 2025-01-23 ASSESSMENT — ENCOUNTER SYMPTOMS
RHINORRHEA: 0
POLYDIPSIA: 0
FLANK PAIN: 0
NECK PAIN: 0
PALPITATIONS: 0
VOMITING: 0
SHORTNESS OF BREATH: 0
FATIGUE: 0
CONSTITUTIONAL NEGATIVE: 1
GASTROINTESTINAL NEGATIVE: 1
COUGH: 0
NAUSEA: 0
CARDIOVASCULAR NEGATIVE: 1
DIZZINESS: 0
FEVER: 0
NEUROLOGICAL NEGATIVE: 1
ABDOMINAL PAIN: 0
ENDOCRINE NEGATIVE: 1
HEMATURIA: 0
WEAKNESS: 0
NECK STIFFNESS: 0
CHILLS: 0
HEADACHES: 0
ADENOPATHY: 0
DYSURIA: 1
RESPIRATORY NEGATIVE: 1
ACTIVITY CHANGE: 0
MYALGIAS: 0
FREQUENCY: 0
MUSCULOSKELETAL NEGATIVE: 1
SORE THROAT: 0
DIARRHEA: 0
LIGHT-HEADEDNESS: 0

## 2025-01-23 NOTE — PROGRESS NOTES
Chief Complaint:    Chief Complaint   Patient presents with    Vaginal Problem     Patient vaginal discharge, odor. Patient reports some urinary frequency but drinks a lot. Patient has no known exposure to STI but would like to be tested.      ASSESSMENT  1. BV (bacterial vaginosis)    2. Dysuria    3. Vaginal discharge    4. Missed menses         PLAN    Urinalysis discussed with patient.  This was unremarkable for UTI.  Wet prep was positive for clue cells.    Rx for Flagyl today.  Urine pregnancy was negative.    We will call with STD results when available.    Follow up with PCP in 1 week if symptoms are not improving.  Worrisome symptoms discussed with instructions to go to the ED.  Patient verbalized understanding and agreed with this plan.    Labs:     Results for orders placed or performed in visit on 01/23/25   UA Macroscopic with reflex to Microscopic and Culture - Lab Collect     Status: Abnormal    Specimen: Urine, Midstream   Result Value Ref Range    Color Urine Yellow Colorless, Straw, Light Yellow, Yellow    Appearance Urine Slightly Cloudy (A) Clear    Glucose Urine Negative Negative mg/dL    Bilirubin Urine Negative Negative    Ketones Urine Negative Negative mg/dL    Specific Gravity Urine 1.025 1.003 - 1.035    Blood Urine Negative Negative    pH Urine 7.0 5.0 - 7.0    Protein Albumin Urine Trace (A) Negative mg/dL    Urobilinogen Urine 1.0 0.2, 1.0 E.U./dL    Nitrite Urine Negative Negative    Leukocyte Esterase Urine Negative Negative   HCG qualitative urine     Status: Normal   Result Value Ref Range    hCG Urine Qualitative Negative Negative   UA Microscopic with Reflex to Culture     Status: Abnormal   Result Value Ref Range    Bacteria Urine Many (A) None Seen /HPF    RBC Urine 2-5 (A) 0-2 /HPF /HPF    WBC Urine 0-5 0-5 /HPF /HPF    Squamous Epithelials Urine Many (A) None Seen /LPF    Mucus Urine Present (A) None Seen /LPF    Narrative    Urine Culture not indicated   Wet prep - lab  collect     Status: Abnormal    Specimen: Vagina; Swab   Result Value Ref Range    Trichomonas Absent Absent    Yeast Absent Absent    Clue Cells Present (A) Absent    WBCs/high power field 1+ (A) None       Problem history    There is no problem list on file for this patient.      Current Meds    Current Outpatient Medications:     metroNIDAZOLE (FLAGYL) 500 MG tablet, Take 1 tablet (500 mg) by mouth 2 times daily for 7 days., Disp: 14 tablet, Rfl: 0    FLUoxetine 20 MG tablet, Take 1 tablet (20 mg) by mouth daily Take half tab for first 3 days, then take full tab daily (Patient not taking: Reported on 10/7/2024), Disp: 30 tablet, Rfl: 3    traZODone (DESYREL) 100 MG tablet, Take 0.5-1 tablets ( mg) by mouth nightly as needed for sleep (Patient not taking: Reported on 10/7/2024), Disp: 30 tablet, Rfl: 3    Allergies  No Known Allergies    SUBJECTIVE    HPI:  Casie Campos is a 34 year old female who has symptoms of urinary frequency, vaginal discharge and vaginal odor for 2 day(s).  she denies back pain, nausea, vomiting, fever, and chills, flank pain.  Patient is sexually active and is requesting STD testing.  She is not aware of any exposure to STDs.    ROS:      Review of Systems   Constitutional: Negative.  Negative for activity change, chills, fatigue and fever.   HENT:  Negative for congestion, ear pain, rhinorrhea and sore throat.    Respiratory: Negative.  Negative for cough and shortness of breath.    Cardiovascular: Negative.  Negative for chest pain and palpitations.   Gastrointestinal: Negative.  Negative for abdominal pain, diarrhea, nausea and vomiting.   Endocrine: Negative.  Negative for polydipsia and polyuria.   Genitourinary:  Positive for dysuria and vaginal discharge. Negative for flank pain, frequency, hematuria, pelvic pain, urgency and vaginal pain.   Musculoskeletal: Negative.  Negative for myalgias, neck pain and neck stiffness.   Allergic/Immunologic: Negative for  immunocompromised state.   Neurological: Negative.  Negative for dizziness, weakness, light-headedness and headaches.   Hematological:  Negative for adenopathy.       Family History   No family history on file.     Social History  Social History     Socioeconomic History    Marital status: Single     Spouse name: Not on file    Number of children: Not on file    Years of education: Not on file    Highest education level: Not on file   Occupational History    Not on file   Tobacco Use    Smoking status: Every Day     Types: Cigarettes     Passive exposure: Never    Smokeless tobacco: Never   Vaping Use    Vaping status: Every Day   Substance and Sexual Activity    Alcohol use: Not on file    Drug use: Not on file    Sexual activity: Not on file   Other Topics Concern    Parent/sibling w/ CABG, MI or angioplasty before 65F 55M? Not Asked   Social History Narrative    Not on file     Social Drivers of Health     Financial Resource Strain: Not on file   Food Insecurity: Not on file   Transportation Needs: Not on file   Physical Activity: Not on file   Stress: Not on file   Social Connections: Not on file   Interpersonal Safety: Not on file   Housing Stability: Not on file           OBJECTIVE     Vital signs noted and reviewed by Yvon Abrams PA-C  /76 (BP Location: Left arm, Patient Position: Sitting, Cuff Size: Adult Regular)   Pulse 89   Temp 98.4  F (36.9  C) (Oral)   Resp 16   Wt 64 kg (141 lb)   LMP 12/03/2024   SpO2 99%   BMI 24.20 kg/m       Physical Exam  Vitals and nursing note reviewed.   Constitutional:       General: She is not in acute distress.     Appearance: Normal appearance. She is well-developed. She is not ill-appearing, toxic-appearing or diaphoretic.   HENT:      Head: Normocephalic and atraumatic.      Right Ear: Tympanic membrane and external ear normal.      Left Ear: Tympanic membrane and external ear normal.   Eyes:      Pupils: Pupils are equal, round, and reactive to light.    Cardiovascular:      Rate and Rhythm: Normal rate and regular rhythm.      Heart sounds: Normal heart sounds. No murmur heard.     No friction rub. No gallop.   Pulmonary:      Effort: Pulmonary effort is normal. No respiratory distress.      Breath sounds: Normal breath sounds. No wheezing or rales.   Chest:      Chest wall: No tenderness.   Abdominal:      General: Bowel sounds are normal. There is no distension.      Palpations: Abdomen is soft. Abdomen is not rigid. There is no mass.      Tenderness: There is no abdominal tenderness. There is no guarding or rebound. Negative signs include Wagner's sign and McBurney's sign.   Musculoskeletal:      Cervical back: Normal range of motion and neck supple.   Lymphadenopathy:      Cervical: No cervical adenopathy.   Skin:     General: Skin is warm and dry.   Neurological:      Mental Status: She is alert and oriented to person, place, and time.      Cranial Nerves: No cranial nerve deficit.      Deep Tendon Reflexes: Reflexes are normal and symmetric.   Psychiatric:         Behavior: Behavior normal. Behavior is cooperative.         Thought Content: Thought content normal.         Judgment: Judgment normal.             Yvon Abrams PA-C  1/23/2025, 4:05 PM